# Patient Record
Sex: FEMALE | Race: WHITE | Employment: FULL TIME | URBAN - METROPOLITAN AREA
[De-identification: names, ages, dates, MRNs, and addresses within clinical notes are randomized per-mention and may not be internally consistent; named-entity substitution may affect disease eponyms.]

---

## 2020-08-06 ENCOUNTER — TELEPHONE (OUTPATIENT)
Dept: FAMILY MEDICINE CLINIC | Age: 50
End: 2020-08-06

## 2020-08-06 DIAGNOSIS — M79.605 PAIN IN BOTH LOWER EXTREMITIES: ICD-10-CM

## 2020-08-06 DIAGNOSIS — F90.0 ATTENTION DEFICIT HYPERACTIVITY DISORDER (ADHD), PREDOMINANTLY INATTENTIVE TYPE: Primary | ICD-10-CM

## 2020-08-06 DIAGNOSIS — M79.604 PAIN IN BOTH LOWER EXTREMITIES: ICD-10-CM

## 2020-08-06 NOTE — TELEPHONE ENCOUNTER
Pt left message requesting refill on Adderall and Hydrocodone. Listed in [facility] as:    dextroamphetamine-amphetamine 20 mg tablet  Take 2 tablets twice a day by oral route. HYDROcodone 10 mg-acetaminophen 325 mg tablet  Take 1 tablet 4 times a day by oral route as needed.

## 2020-08-07 PROBLEM — M79.604 PAIN IN BOTH LOWER EXTREMITIES: Status: ACTIVE | Noted: 2020-08-07

## 2020-08-07 PROBLEM — F90.0 ATTENTION DEFICIT HYPERACTIVITY DISORDER (ADHD), PREDOMINANTLY INATTENTIVE TYPE: Status: ACTIVE | Noted: 2020-08-07

## 2020-08-07 PROBLEM — M79.605 PAIN IN BOTH LOWER EXTREMITIES: Status: ACTIVE | Noted: 2020-08-07

## 2020-08-07 RX ORDER — DEXTROAMPHETAMINE SACCHARATE, AMPHETAMINE ASPARTATE, DEXTROAMPHETAMINE SULFATE AND AMPHETAMINE SULFATE 5; 5; 5; 5 MG/1; MG/1; MG/1; MG/1
TABLET ORAL
Qty: 120 TAB | Refills: 0 | Status: SHIPPED | OUTPATIENT
Start: 2020-08-07 | End: 2020-08-28 | Stop reason: SDUPTHER

## 2020-08-07 RX ORDER — HYDROCODONE BITARTRATE AND ACETAMINOPHEN 10; 325 MG/1; MG/1
1 TABLET ORAL
Qty: 120 TAB | Refills: 0 | Status: SHIPPED | OUTPATIENT
Start: 2020-08-07 | End: 2020-08-28 | Stop reason: SDUPTHER

## 2020-08-07 RX ORDER — DEXTROAMPHETAMINE SACCHARATE, AMPHETAMINE ASPARTATE, DEXTROAMPHETAMINE SULFATE AND AMPHETAMINE SULFATE 5; 5; 5; 5 MG/1; MG/1; MG/1; MG/1
20 TABLET ORAL 2 TIMES DAILY
COMMUNITY
End: 2020-08-07 | Stop reason: SDUPTHER

## 2020-08-07 NOTE — TELEPHONE ENCOUNTER
The patient requires narcotic pain medication for the diagnosis of:Leg pain, back and neck pain, knee pain. Cannot take nsaids as she is on warfarin for chronic DVT. Medication: Hydrocodone 10/325 mg 4 times daily PRN  Adjunctive therapy tried:  Prescription Monitoring Program Queried:          Last Refill:  7/7/2020  MME: 36  Benzo:NONE  UDS:UP to date  Reason to continue:It does allow her to function and rest.   WE have discussed the following regarding this narcotic prescription on multiple occasions:  Risk associated with medicine including but not limited to addiction, overdose, mental impairment , disability and /or death. ESPECIALLY if taken other than as prescribed which is AS NEEDED FOR PAIN. IT is NOT a nerve pill or antidepressant or sleeping pill. Take sparingly   Let the doctor know of any side effects  Appropriate use      1. Attention deficit hyperactivity disorder (ADHD), predominantly inattentive type  Stable with adderall,  Allows her to function at work. - dextroamphetamine-amphetamine (ADDERALL) 20 mg tablet; Takes 2 tablets orally BID (2 in am and 2 in afternoon)  Dispense: 120 Tab; Refill: 0    2. Pain in both lower extremities    - HYDROcodone-acetaminophen (NORCO)  mg tablet; Take 1 Tab by mouth every six (6) hours as needed for Pain for up to 30 days. Max Daily Amount: 4 Tabs. Dispense: 120 Tab;  Refill: 0

## 2020-08-10 ENCOUNTER — CLINICAL SUPPORT (OUTPATIENT)
Dept: FAMILY MEDICINE CLINIC | Age: 50
End: 2020-08-10
Payer: COMMERCIAL

## 2020-08-10 VITALS
HEIGHT: 67 IN | DIASTOLIC BLOOD PRESSURE: 76 MMHG | HEART RATE: 110 BPM | RESPIRATION RATE: 18 BRPM | WEIGHT: 210 LBS | BODY MASS INDEX: 32.96 KG/M2 | OXYGEN SATURATION: 98 % | SYSTOLIC BLOOD PRESSURE: 128 MMHG | TEMPERATURE: 98.2 F

## 2020-08-10 DIAGNOSIS — I82.503 CHRONIC DEEP VEIN THROMBOSIS (DVT) OF BOTH LOWER EXTREMITIES, UNSPECIFIED VEIN (HCC): Primary | ICD-10-CM

## 2020-08-10 LAB
INR BLD: 3.2
PT POC: 37.9 SECONDS
VALID INTERNAL CONTROL?: YES

## 2020-08-10 PROCEDURE — 85610 PROTHROMBIN TIME: CPT | Performed by: FAMILY MEDICINE

## 2020-08-10 RX ORDER — ZOLPIDEM TARTRATE 10 MG/1
TABLET ORAL
COMMUNITY
End: 2020-09-11 | Stop reason: SDUPTHER

## 2020-08-10 RX ORDER — LANCETS 33 GAUGE
EACH MISCELLANEOUS
COMMUNITY
End: 2022-06-10 | Stop reason: SDUPTHER

## 2020-08-10 RX ORDER — METFORMIN HYDROCHLORIDE 1000 MG/1
1000 TABLET ORAL 2 TIMES DAILY WITH MEALS
COMMUNITY
End: 2020-11-02

## 2020-08-28 DIAGNOSIS — F90.0 ATTENTION DEFICIT HYPERACTIVITY DISORDER (ADHD), PREDOMINANTLY INATTENTIVE TYPE: ICD-10-CM

## 2020-08-28 DIAGNOSIS — M79.604 PAIN IN BOTH LOWER EXTREMITIES: ICD-10-CM

## 2020-08-28 DIAGNOSIS — M79.605 PAIN IN BOTH LOWER EXTREMITIES: ICD-10-CM

## 2020-08-28 RX ORDER — HYDROCODONE BITARTRATE AND ACETAMINOPHEN 10; 325 MG/1; MG/1
1 TABLET ORAL
Qty: 120 TAB | Refills: 0 | Status: SHIPPED | OUTPATIENT
Start: 2020-08-28 | End: 2020-09-27

## 2020-08-28 RX ORDER — DEXTROAMPHETAMINE SACCHARATE, AMPHETAMINE ASPARTATE, DEXTROAMPHETAMINE SULFATE AND AMPHETAMINE SULFATE 5; 5; 5; 5 MG/1; MG/1; MG/1; MG/1
TABLET ORAL
Qty: 120 TAB | Refills: 0 | Status: SHIPPED | OUTPATIENT
Start: 2020-08-28 | End: 2020-10-07 | Stop reason: SDUPTHER

## 2020-08-28 NOTE — PROGRESS NOTES
1. Attention deficit hyperactivity disorder (ADHD), predominantly inattentive type    - dextroamphetamine-amphetamine (ADDERALL) 20 mg tablet; Takes 2 tablets orally BID (2 in am and 2 in afternoon)  Dispense: 120 Tab; Refill: 0    2. Pain in both lower extremities    - HYDROcodone-acetaminophen (NORCO)  mg tablet; Take 1 Tab by mouth every six (6) hours as needed for Pain for up to 30 days. Max Daily Amount: 4 Tabs. Dispense: 120 Tab;  Refill: 0

## 2020-09-08 RX ORDER — WARFARIN 10 MG/1
TABLET ORAL
Qty: 30 TAB | Refills: 5 | Status: SHIPPED | OUTPATIENT
Start: 2020-09-08 | End: 2021-09-03

## 2020-09-11 ENCOUNTER — CLINICAL SUPPORT (OUTPATIENT)
Dept: FAMILY MEDICINE CLINIC | Age: 50
End: 2020-09-11
Payer: COMMERCIAL

## 2020-09-11 ENCOUNTER — TELEPHONE (OUTPATIENT)
Dept: FAMILY MEDICINE CLINIC | Age: 50
End: 2020-09-11

## 2020-09-11 VITALS
WEIGHT: 213 LBS | HEART RATE: 88 BPM | OXYGEN SATURATION: 98 % | RESPIRATION RATE: 18 BRPM | TEMPERATURE: 98.2 F | SYSTOLIC BLOOD PRESSURE: 122 MMHG | DIASTOLIC BLOOD PRESSURE: 80 MMHG | BODY MASS INDEX: 33.86 KG/M2

## 2020-09-11 DIAGNOSIS — F51.01 PRIMARY INSOMNIA: ICD-10-CM

## 2020-09-11 DIAGNOSIS — I26.99 PULMONARY EMBOLISM, UNSPECIFIED CHRONICITY, UNSPECIFIED PULMONARY EMBOLISM TYPE, UNSPECIFIED WHETHER ACUTE COR PULMONALE PRESENT (HCC): Primary | ICD-10-CM

## 2020-09-11 DIAGNOSIS — Z86.711 HISTORY OF PULMONARY EMBOLISM: ICD-10-CM

## 2020-09-11 DIAGNOSIS — F51.01 PRIMARY INSOMNIA: Primary | ICD-10-CM

## 2020-09-11 LAB
INR BLD: 2.9
PT POC: 34.3 SECONDS
VALID INTERNAL CONTROL?: YES

## 2020-09-11 PROCEDURE — 93793 ANTICOAG MGMT PT WARFARIN: CPT | Performed by: FAMILY MEDICINE

## 2020-09-11 PROCEDURE — 85610 PROTHROMBIN TIME: CPT | Performed by: FAMILY MEDICINE

## 2020-09-11 RX ORDER — ZOLPIDEM TARTRATE 10 MG/1
10 TABLET ORAL
Qty: 30 TAB | Refills: 3 | Status: SHIPPED | OUTPATIENT
Start: 2020-09-11 | End: 2021-03-25 | Stop reason: SDUPTHER

## 2020-09-11 NOTE — PROGRESS NOTES
Patient presents to office today for PT / INR. She is presently taking warfarin 7.5 mg on Sunday,Monday, Tuesday, Thursday, Friday, Saturday and warfarin 10  Mg on Wednesday. PT- 34.3 and INR 2.9. Patient is also requesting a refill on her zolpidem 10 mg.

## 2020-09-11 NOTE — TELEPHONE ENCOUNTER
1. Primary insomnia    - zolpidem (AMBIEN) 10 mg tablet; Take 1 Tab by mouth nightly as needed for Sleep. Max Daily Amount: 10 mg. Dispense: 30 Tab; Refill: 3      Has rx for hydrocodone and does have narcan on file. Been on this regimen for years and actually only takes the zolpidem during night shift so she can sleep during day.     Tanisha Vicente, DO

## 2020-09-14 RX ORDER — ZOLPIDEM TARTRATE 10 MG/1
10 TABLET ORAL
Qty: 30 TAB | Refills: 3 | Status: SHIPPED | OUTPATIENT
Start: 2020-09-14 | End: 2020-10-07 | Stop reason: SDUPTHER

## 2020-10-07 ENCOUNTER — TELEPHONE (OUTPATIENT)
Dept: FAMILY MEDICINE CLINIC | Age: 50
End: 2020-10-07

## 2020-10-07 DIAGNOSIS — M79.604 PAIN IN BOTH LOWER EXTREMITIES: Primary | ICD-10-CM

## 2020-10-07 DIAGNOSIS — F90.0 ATTENTION DEFICIT HYPERACTIVITY DISORDER (ADHD), PREDOMINANTLY INATTENTIVE TYPE: ICD-10-CM

## 2020-10-07 DIAGNOSIS — M79.605 PAIN IN BOTH LOWER EXTREMITIES: Primary | ICD-10-CM

## 2020-10-07 RX ORDER — HYDROCODONE BITARTRATE AND ACETAMINOPHEN 10; 325 MG/1; MG/1
1 TABLET ORAL
Qty: 120 TAB | Refills: 0 | Status: SHIPPED | OUTPATIENT
Start: 2020-10-07 | End: 2020-10-10

## 2020-10-07 RX ORDER — DEXTROAMPHETAMINE SACCHARATE, AMPHETAMINE ASPARTATE, DEXTROAMPHETAMINE SULFATE AND AMPHETAMINE SULFATE 5; 5; 5; 5 MG/1; MG/1; MG/1; MG/1
TABLET ORAL
Qty: 120 TAB | Refills: 0 | Status: SHIPPED | OUTPATIENT
Start: 2020-10-07 | End: 2020-11-09 | Stop reason: SDUPTHER

## 2020-10-07 NOTE — TELEPHONE ENCOUNTER
48 y.o. , Linsey Mckinney , female       No Known Allergies  Current Outpatient Medications on File Prior to Visit   Medication Sig Dispense Refill    [DISCONTINUED] zolpidem (AMBIEN) 10 mg tablet Take 1 Tab by mouth nightly as needed for Sleep. Max Daily Amount: 10 mg. 30 Tab 3    zolpidem (AMBIEN) 10 mg tablet Take 1 Tab by mouth nightly as needed for Sleep. Max Daily Amount: 10 mg. 30 Tab 3    warfarin (COUMADIN) 10 mg tablet TAKE 1 TABLET BY MOUTH EVERY DAY 30 Tab 5    [DISCONTINUED] dextroamphetamine-amphetamine (ADDERALL) 20 mg tablet Takes 2 tablets orally BID (2 in am and 2 in afternoon) 120 Tab 0    metFORMIN (GLUCOPHAGE) 1,000 mg tablet Take 1,000 mg by mouth two (2) times daily (with meals).  lancets (OneTouch Delica Plus Lancet) 33 gauge misc by Does Not Apply route. No current facility-administered medications on file prior to visit. Patient Active Problem List   Diagnosis Code    Pain in both lower extremities M79.604, M79.605    Attention deficit hyperactivity disorder (ADHD), predominantly inattentive type F90.0       1. Attention deficit hyperactivity disorder (ADHD), predominantly inattentive type    - dextroamphetamine-amphetamine (ADDERALL) 20 mg tablet; Takes 2 tablets orally BID (2 in am and 2 in afternoon)  Dispense: 120 Tab; Refill: 0    2. Pain in both lower extremities    - HYDROcodone-acetaminophen (NORCO)  mg tablet; Take 1 Tab by mouth every six (6) hours as needed for Pain for up to 3 days. Max Daily Amount: 4 Tabs. Dispense: 120 Tab;  Refill: 0      Katerina Lester DO

## 2020-11-02 RX ORDER — METFORMIN HYDROCHLORIDE 1000 MG/1
TABLET ORAL
Qty: 180 TAB | Refills: 3 | Status: SHIPPED | OUTPATIENT
Start: 2020-11-02 | End: 2022-01-10

## 2020-11-09 ENCOUNTER — TELEPHONE (OUTPATIENT)
Dept: FAMILY MEDICINE CLINIC | Age: 50
End: 2020-11-09

## 2020-11-09 DIAGNOSIS — F90.0 ATTENTION DEFICIT HYPERACTIVITY DISORDER (ADHD), PREDOMINANTLY INATTENTIVE TYPE: ICD-10-CM

## 2020-11-09 DIAGNOSIS — M79.605 PAIN IN BOTH LOWER EXTREMITIES: Primary | ICD-10-CM

## 2020-11-09 DIAGNOSIS — M79.604 PAIN IN BOTH LOWER EXTREMITIES: Primary | ICD-10-CM

## 2020-11-09 RX ORDER — DEXTROAMPHETAMINE SACCHARATE, AMPHETAMINE ASPARTATE, DEXTROAMPHETAMINE SULFATE AND AMPHETAMINE SULFATE 5; 5; 5; 5 MG/1; MG/1; MG/1; MG/1
TABLET ORAL
Qty: 120 TAB | Refills: 0 | Status: SHIPPED | OUTPATIENT
Start: 2020-11-09 | End: 2020-12-10 | Stop reason: SDUPTHER

## 2020-11-09 RX ORDER — HYDROCODONE BITARTRATE AND ACETAMINOPHEN 10; 325 MG/1; MG/1
1 TABLET ORAL
Qty: 120 TAB | Refills: 0 | Status: SHIPPED | OUTPATIENT
Start: 2020-11-09 | End: 2020-12-10 | Stop reason: SDUPTHER

## 2020-11-09 NOTE — TELEPHONE ENCOUNTER
48 y.o. , Lauretha Baumgarten , female       No Known Allergies  Current Outpatient Medications on File Prior to Visit   Medication Sig Dispense Refill    metFORMIN (GLUCOPHAGE) 1,000 mg tablet TAKE 1 TABLET BY MOUTH TWICE DAILY BEFORE MEALS 180 Tab 3    [DISCONTINUED] dextroamphetamine-amphetamine (ADDERALL) 20 mg tablet Takes 2 tablets orally BID (2 in am and 2 in afternoon) 120 Tab 0    zolpidem (AMBIEN) 10 mg tablet Take 1 Tab by mouth nightly as needed for Sleep. Max Daily Amount: 10 mg. 30 Tab 3    warfarin (COUMADIN) 10 mg tablet TAKE 1 TABLET BY MOUTH EVERY DAY 30 Tab 5    lancets (OneTouch Delica Plus Lancet) 33 gauge misc by Does Not Apply route. No current facility-administered medications on file prior to visit. Patient Active Problem List   Diagnosis Code    Pain in both lower extremities M79.604, M79.605    Attention deficit hyperactivity disorder (ADHD), predominantly inattentive type F90.0       1. Attention deficit hyperactivity disorder (ADHD), predominantly inattentive type    - dextroamphetamine-amphetamine (ADDERALL) 20 mg tablet; Takes 2 tablets orally BID (2 in am and 2 in afternoon)  Dispense: 120 Tab; Refill: 0    2. Pain in both lower extremities    - HYDROcodone-acetaminophen (NORCO)  mg tablet; Take 1 Tab by mouth every six (6) hours as needed for Pain for up to 30 days. Max Daily Amount: 4 Tabs. Dispense: 120 Tab; Refill: 0      Patient calls requesting a prescription refill for the hydrocodone and Adderall. Because she is not a candidate for nonsteroidals. She has persistent recurrent and chronic back and leg pain off of neck and shoulder pain. 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 The medicine does allow her to have an improved quality life and function. Monitoring program is reviewed and morphine milligram equivalent is 40. There is no aberrant behavior noted. She also takes the Adderall.   This allows her to function during her job which requires a lot of concentration as a . She has not had any instances indication for diversion or misuse.   Josefa Abreu, DO

## 2020-11-09 NOTE — TELEPHONE ENCOUNTER
Patient needs a refill on HYDROcodone 10 mg-acetaminophen 325 mg tablet and dextroamphetamine-amphetamine (ADDERALL) 20 mg tablet sent to [facility]. Patient states that she will run out today.

## 2020-12-07 DIAGNOSIS — I26.99 PULMONARY EMBOLISM, UNSPECIFIED CHRONICITY, UNSPECIFIED PULMONARY EMBOLISM TYPE, UNSPECIFIED WHETHER ACUTE COR PULMONALE PRESENT (HCC): Primary | ICD-10-CM

## 2020-12-07 RX ORDER — WARFARIN 7.5 MG/1
7.5 TABLET ORAL DAILY
Qty: 90 TAB | Refills: 1 | Status: SHIPPED | OUTPATIENT
Start: 2020-12-07 | End: 2021-06-05

## 2020-12-10 ENCOUNTER — TELEPHONE (OUTPATIENT)
Dept: FAMILY MEDICINE CLINIC | Age: 50
End: 2020-12-10

## 2020-12-10 DIAGNOSIS — M79.604 PAIN IN BOTH LOWER EXTREMITIES: ICD-10-CM

## 2020-12-10 DIAGNOSIS — M79.605 PAIN IN BOTH LOWER EXTREMITIES: ICD-10-CM

## 2020-12-10 DIAGNOSIS — F90.0 ATTENTION DEFICIT HYPERACTIVITY DISORDER (ADHD), PREDOMINANTLY INATTENTIVE TYPE: ICD-10-CM

## 2020-12-10 RX ORDER — HYDROCODONE BITARTRATE AND ACETAMINOPHEN 10; 325 MG/1; MG/1
1 TABLET ORAL
Qty: 120 TAB | Refills: 0 | Status: SHIPPED | OUTPATIENT
Start: 2020-12-10 | End: 2020-12-30 | Stop reason: SDUPTHER

## 2020-12-10 RX ORDER — DEXTROAMPHETAMINE SACCHARATE, AMPHETAMINE ASPARTATE, DEXTROAMPHETAMINE SULFATE AND AMPHETAMINE SULFATE 5; 5; 5; 5 MG/1; MG/1; MG/1; MG/1
TABLET ORAL
Qty: 120 TAB | Refills: 0 | Status: SHIPPED | OUTPATIENT
Start: 2020-12-10 | End: 2020-12-30 | Stop reason: SDUPTHER

## 2020-12-10 NOTE — TELEPHONE ENCOUNTER
Pt left message. Checking on the status of refills she requested. Adderall, Hydrocodone, Warfarin 7.5 and Warfarin 10. I see that only the Warfarin 7.5 mg was sent in.

## 2020-12-10 NOTE — TELEPHONE ENCOUNTER
48 y.o. , Dony Shield , female       No Known Allergies  Current Outpatient Medications on File Prior to Visit   Medication Sig Dispense Refill    warfarin (COUMADIN) 7.5 mg tablet Take 1 Tab by mouth daily for 180 days. 90 Tab 1    [DISCONTINUED] dextroamphetamine-amphetamine (ADDERALL) 20 mg tablet Takes 2 tablets orally BID (2 in am and 2 in afternoon) 120 Tab 0    [DISCONTINUED] HYDROcodone-acetaminophen (NORCO)  mg tablet Take 1 Tab by mouth every six (6) hours as needed for Pain for up to 30 days. Max Daily Amount: 4 Tabs. 120 Tab 0    metFORMIN (GLUCOPHAGE) 1,000 mg tablet TAKE 1 TABLET BY MOUTH TWICE DAILY BEFORE MEALS 180 Tab 3    zolpidem (AMBIEN) 10 mg tablet Take 1 Tab by mouth nightly as needed for Sleep. Max Daily Amount: 10 mg. 30 Tab 3    warfarin (COUMADIN) 10 mg tablet TAKE 1 TABLET BY MOUTH EVERY DAY 30 Tab 5    lancets (OneTouch Delica Plus Lancet) 33 gauge misc by Does Not Apply route. No current facility-administered medications on file prior to visit. Patient Active Problem List   Diagnosis Code    Pain in both lower extremities M79.604, M79.605    Attention deficit hyperactivity disorder (ADHD), predominantly inattentive type F90.0         ICD-10-CM ICD-9-CM    1. Attention deficit hyperactivity disorder (ADHD), predominantly inattentive type  F90.0 314.00 dextroamphetamine-amphetamine (ADDERALL) 20 mg tablet   2. Pain in both lower extremities  M79.604 729.5 HYDROcodone-acetaminophen (NORCO)  mg tablet    M79.605         Reason for pain medicine:  Back , knee , shoulder pain and cannot take NSAIDS due to anticoagulation  Name of pain medication: Hydrocodone/apap 10/325   was reviewed today and we find the following results;   Last refill of narcotic: 11-9-2020  Last refill of Benzo(if applicable):  NONE  MME:  36  NARCAN:  On file   Review of records :NO indicators of diversion or misuse  ADDITIONAL INDICATORS: NONE on   Reasoning (including goals of therapy): This medicine does allow improved quality life and function for [facility] Her last visit was March 2020. Her urine drug screen then was appropriate. I do recommend she comes in within the next month or 2 to get more urine drug screening and update her controlled substance contract with [facility]. She will be advised per staff.       Cash Heredia, DO

## 2020-12-10 NOTE — TELEPHONE ENCOUNTER
Patient called requesting CV-BAYFKASP and RX-Hydrocodone be sent to the pharmacy. Patient is out as of yesterday.

## 2020-12-30 DIAGNOSIS — M79.605 PAIN IN BOTH LOWER EXTREMITIES: ICD-10-CM

## 2020-12-30 DIAGNOSIS — M79.604 PAIN IN BOTH LOWER EXTREMITIES: ICD-10-CM

## 2020-12-30 DIAGNOSIS — F90.0 ATTENTION DEFICIT HYPERACTIVITY DISORDER (ADHD), PREDOMINANTLY INATTENTIVE TYPE: ICD-10-CM

## 2020-12-30 RX ORDER — DEXTROAMPHETAMINE SACCHARATE, AMPHETAMINE ASPARTATE, DEXTROAMPHETAMINE SULFATE AND AMPHETAMINE SULFATE 5; 5; 5; 5 MG/1; MG/1; MG/1; MG/1
TABLET ORAL
Qty: 120 TAB | Refills: 0 | Status: SHIPPED | OUTPATIENT
Start: 2020-12-30 | End: 2021-01-13 | Stop reason: SDUPTHER

## 2020-12-30 RX ORDER — HYDROCODONE BITARTRATE AND ACETAMINOPHEN 10; 325 MG/1; MG/1
1 TABLET ORAL
Qty: 120 TAB | Refills: 0 | Status: SHIPPED | OUTPATIENT
Start: 2020-12-30 | End: 2021-01-13 | Stop reason: SDUPTHER

## 2020-12-30 NOTE — PROGRESS NOTES
48 y.o. , Narvis Fillers , female       No Known Allergies  Current Outpatient Medications on File Prior to Visit   Medication Sig Dispense Refill    [DISCONTINUED] dextroamphetamine-amphetamine (ADDERALL) 20 mg tablet Takes 2 tablets orally BID (2 in am and 2 in afternoon) 120 Tab 0    [DISCONTINUED] HYDROcodone-acetaminophen (NORCO)  mg tablet Take 1 Tab by mouth every six (6) hours as needed for Pain for up to 30 days. Max Daily Amount: 4 Tabs. 120 Tab 0    warfarin (COUMADIN) 7.5 mg tablet Take 1 Tab by mouth daily for 180 days. 90 Tab 1    metFORMIN (GLUCOPHAGE) 1,000 mg tablet TAKE 1 TABLET BY MOUTH TWICE DAILY BEFORE MEALS 180 Tab 3    zolpidem (AMBIEN) 10 mg tablet Take 1 Tab by mouth nightly as needed for Sleep. Max Daily Amount: 10 mg. 30 Tab 3    warfarin (COUMADIN) 10 mg tablet TAKE 1 TABLET BY MOUTH EVERY DAY 30 Tab 5    lancets (OneTouch Delica Plus Lancet) 33 gauge misc by Does Not Apply route. No current facility-administered medications on file prior to visit. Patient Active Problem List   Diagnosis Code    Pain in both lower extremities M79.604, M79.605    Attention deficit hyperactivity disorder (ADHD), predominantly inattentive type F90.0         ICD-10-CM ICD-9-CM    1. Pain in both lower extremities  M79.604 729.5 HYDROcodone-acetaminophen (NORCO)  mg tablet    M79.605     2. Attention deficit hyperactivity disorder (ADHD), predominantly inattentive type  F90.0 314.00 dextroamphetamine-amphetamine (ADDERALL) 20 mg tablet       Patient is up to date on office visits, needs one next month however, up to date on UDS and  reviewed within last 3 months. Medication sent in as She is stable and I will be out when it is due.      Maycol Seats, DO

## 2021-01-13 ENCOUNTER — OFFICE VISIT (OUTPATIENT)
Dept: FAMILY MEDICINE CLINIC | Age: 51
End: 2021-01-13
Payer: COMMERCIAL

## 2021-01-13 VITALS
DIASTOLIC BLOOD PRESSURE: 89 MMHG | WEIGHT: 213 LBS | OXYGEN SATURATION: 96 % | HEART RATE: 117 BPM | HEIGHT: 67 IN | BODY MASS INDEX: 33.43 KG/M2 | TEMPERATURE: 97.3 F | SYSTOLIC BLOOD PRESSURE: 158 MMHG

## 2021-01-13 DIAGNOSIS — G89.29 CHRONIC PAIN OF BOTH KNEES: ICD-10-CM

## 2021-01-13 DIAGNOSIS — M54.42 CHRONIC BILATERAL LOW BACK PAIN WITH BILATERAL SCIATICA: ICD-10-CM

## 2021-01-13 DIAGNOSIS — M79.605 PAIN IN BOTH LOWER EXTREMITIES: Primary | ICD-10-CM

## 2021-01-13 DIAGNOSIS — I26.99 PULMONARY EMBOLISM, UNSPECIFIED CHRONICITY, UNSPECIFIED PULMONARY EMBOLISM TYPE, UNSPECIFIED WHETHER ACUTE COR PULMONALE PRESENT (HCC): ICD-10-CM

## 2021-01-13 DIAGNOSIS — F90.0 ATTENTION DEFICIT HYPERACTIVITY DISORDER (ADHD), PREDOMINANTLY INATTENTIVE TYPE: ICD-10-CM

## 2021-01-13 DIAGNOSIS — G89.29 CHRONIC BILATERAL LOW BACK PAIN WITH BILATERAL SCIATICA: ICD-10-CM

## 2021-01-13 DIAGNOSIS — M25.561 CHRONIC PAIN OF BOTH KNEES: ICD-10-CM

## 2021-01-13 DIAGNOSIS — M54.41 CHRONIC BILATERAL LOW BACK PAIN WITH BILATERAL SCIATICA: ICD-10-CM

## 2021-01-13 DIAGNOSIS — M79.604 PAIN IN BOTH LOWER EXTREMITIES: Primary | ICD-10-CM

## 2021-01-13 DIAGNOSIS — I82.503 CHRONIC DEEP VEIN THROMBOSIS (DVT) OF BOTH LOWER EXTREMITIES, UNSPECIFIED VEIN (HCC): ICD-10-CM

## 2021-01-13 DIAGNOSIS — F51.01 PRIMARY INSOMNIA: ICD-10-CM

## 2021-01-13 DIAGNOSIS — M25.562 CHRONIC PAIN OF BOTH KNEES: ICD-10-CM

## 2021-01-13 DIAGNOSIS — E11.9 CONTROLLED TYPE 2 DIABETES MELLITUS WITHOUT COMPLICATION, WITHOUT LONG-TERM CURRENT USE OF INSULIN (HCC): ICD-10-CM

## 2021-01-13 PROCEDURE — 99214 OFFICE O/P EST MOD 30 MIN: CPT | Performed by: FAMILY MEDICINE

## 2021-01-13 RX ORDER — HYDROCODONE BITARTRATE AND ACETAMINOPHEN 10; 325 MG/1; MG/1
1 TABLET ORAL
Qty: 28 TAB | Refills: 0 | Status: SHIPPED | OUTPATIENT
Start: 2021-01-13 | End: 2021-01-20

## 2021-01-13 RX ORDER — DEXTROAMPHETAMINE SACCHARATE, AMPHETAMINE ASPARTATE, DEXTROAMPHETAMINE SULFATE AND AMPHETAMINE SULFATE 5; 5; 5; 5 MG/1; MG/1; MG/1; MG/1
TABLET ORAL
Qty: 120 TAB | Refills: 0 | Status: SHIPPED | OUTPATIENT
Start: 2021-01-13 | End: 2021-02-15 | Stop reason: SDUPTHER

## 2021-01-13 NOTE — PROGRESS NOTES
IIDENTIFYING INFORMATION:  Saúl Staff. Yen Johnson , 48 y.o., female      CHIEF COMPLAINT:   Chief Complaint   Patient presents with    Diabetes    Pain (Chronic)    Behavioral Problem         HISTORY OF PRESENT ILLNESS:    Adalid Mcallister is a 48 y.o. female with the below listed medical problems whom comes in today for follow-up on her pain in her legs, ADHD, diabetes. Dayan Steele cannot take nonsteroidals because she is on warfarin chronically for chronic DVTs and PE. She is seen the vascular specialist before recommend she go off meds but this was not consistent with what was in the literature. Come to find out his note indicated that she had only had 1 episode of this. This is many years ago and she does not recollect being tested for hypercoagulable state. Nonetheless, she does well on the warfarin. Her INR generally between 1.8 and 3.6. She does work shift work and will occasionally forget days. She has no bruising or bleeding issues no leg swelling. She does have chronic knee and back pain. That gets worse with more activity she does. 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 She is not able to take the nonsteroidals. She will take 2-3 hydrocodone per day and sometimes 4 on a very bad day. Unfortunate, her insurance changed and she has had switched pharmacies and may be having some issues try to get medicine. She is always been diligent and she says her last dose of hydrocodone was day prior. That is due to this insurance issue. I did give her a week of medicine until we get this prior authorization resolved. She says it does allow her to function especially she works hard on the farm and she has no dizziness or drowsiness. She has no constipation. She also has ADHD and continues to meet criteria. 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   She will not take it when she is on the night shift because she does have to go to sleep since she gets home and often cannot. She has no chest pain or palpitations. She has gained a little bit of weight but generally stays around the same and her appetite is good. She also has diabetes and is due for some blood work. She feels like her current regimen of medications are helping. Lastly, she will occasionally take zolpidem to help her sleep when she is on the night shift. PAST MEDICAL HISTORY:   Patient Active Problem List   Diagnosis Code    Pain in both lower extremities M79.604, M79.605    Attention deficit hyperactivity disorder (ADHD), predominantly inattentive type F90.0        ALLERGIES:   No Known Allergies      MEDICATIONS:     Current Outpatient Medications:     HYDROcodone-acetaminophen (NORCO)  mg tablet, Take 1 Tab by mouth every six (6) hours as needed for Pain for up to 30 days. Max Daily Amount: 4 Tabs. May fill on or after January 7, 2021, Disp: 120 Tab, Rfl: 0    dextroamphetamine-amphetamine (ADDERALL) 20 mg tablet, Takes 2 tablets orally BID (2 in am and 2 in afternoon)-May fill on or after January 7, 2021, Disp: 120 Tab, Rfl: 0    warfarin (COUMADIN) 7.5 mg tablet, Take 1 Tab by mouth daily for 180 days. , Disp: 90 Tab, Rfl: 1    metFORMIN (GLUCOPHAGE) 1,000 mg tablet, TAKE 1 TABLET BY MOUTH TWICE DAILY BEFORE MEALS, Disp: 180 Tab, Rfl: 3    zolpidem (AMBIEN) 10 mg tablet, Take 1 Tab by mouth nightly as needed for Sleep. Max Daily Amount: 10 mg., Disp: 30 Tab, Rfl: 3    warfarin (COUMADIN) 10 mg tablet, TAKE 1 TABLET BY MOUTH EVERY DAY, Disp: 30 Tab, Rfl: 5    lancets (OneTouch Delica Plus Lancet) 33 gauge misc, by Does Not Apply route., Disp: , Rfl:     SOCIAL HISTORY:   Social History     Tobacco Use    Smoking status: Former Smoker    Smokeless tobacco: Never Used   Substance Use Topics    Alcohol use: Not on file    Drug use: Not on file       SURGICAL HISTORY:  History reviewed.  No pertinent surgical history. FAMILY HISTORY:  Family History   Problem Relation Age of Onset    No Known Problems Mother     No Known Problems Father          REVIEW OF SYSTEMS:  I personally collected this information from all available source present (patient/others in room and records available) -JLEWISDESTHER    Review of Systems   Constitutional: Negative for activity change, appetite change, chills, diaphoresis, fever and unexpected weight change. HENT: Negative for congestion, ear discharge, ear pain, hearing loss, rhinorrhea, sinus pressure, sneezing, sore throat, tinnitus, trouble swallowing and voice change. Eyes: Negative for photophobia, pain, discharge and visual disturbance. Respiratory: Negative for cough, chest tightness, shortness of breath and wheezing. Cardiovascular: Negative for chest pain, palpitations and leg swelling. Gastrointestinal: Negative for abdominal distention, abdominal pain, blood in stool, constipation, diarrhea, nausea and vomiting. Endocrine: Negative for cold intolerance, heat intolerance, polydipsia and polyphagia. Genitourinary: Negative for difficulty urinating, dysuria, frequency, hematuria and urgency. Musculoskeletal: Positive for arthralgias, back pain, gait problem, joint swelling and myalgias. Negative for neck pain. Skin: Negative for color change and rash. Neurological: Negative for dizziness, tremors, syncope, speech difficulty, weakness, light-headedness, numbness and headaches. Hematological: Negative for adenopathy. Does not bruise/bleed easily. Psychiatric/Behavioral: Negative for agitation, behavioral problems, confusion, decreased concentration, dysphoric mood, hallucinations, sleep disturbance and suicidal ideas. The patient is not nervous/anxious.                  PHYSICAL EXAMINATION:  Vital Signs:    Visit Vitals  BP (!) 158/89 (BP 1 Location: Right arm, BP Patient Position: Sitting)   Pulse (!) 117   Temp 97.3 °F (36.3 °C) (Temporal)   Ht 5' 6.5\" (1.689 m)   Wt 213 lb (96.6 kg)   SpO2 96%   BMI 33.86 kg/m²         Wt Readings from Last 3 Encounters:   01/13/21 213 lb (96.6 kg)   09/11/20 213 lb (96.6 kg)   08/10/20 210 lb (95.3 kg)     BP Readings from Last 3 Encounters:   01/13/21 (!) 158/89   09/11/20 122/80   08/10/20 128/76         Physical Exam  Nursing note reviewed. Constitutional:       General: She is not in acute distress. Appearance: Normal appearance. She is obese. She is not ill-appearing or toxic-appearing. HENT:      Right Ear: Tympanic membrane, ear canal and external ear normal.      Left Ear: Tympanic membrane, ear canal and external ear normal.      Nose: No congestion or rhinorrhea. Mouth/Throat:      Pharynx: No oropharyngeal exudate or posterior oropharyngeal erythema. Eyes:      General: No scleral icterus. Extraocular Movements: Extraocular movements intact. Conjunctiva/sclera: Conjunctivae normal.      Pupils: Pupils are equal, round, and reactive to light. Neck:      Musculoskeletal: No neck rigidity or muscular tenderness. Vascular: No carotid bruit. Cardiovascular:      Rate and Rhythm: Normal rate and regular rhythm. Heart sounds: No murmur. No friction rub. No gallop. Pulmonary:      Effort: Pulmonary effort is normal.      Breath sounds: Normal breath sounds. No wheezing, rhonchi or rales. Abdominal:      General: Bowel sounds are normal. There is no distension. Palpations: Abdomen is soft. There is no mass. Tenderness: There is no abdominal tenderness. Musculoskeletal:         General: Swelling, tenderness and deformity present. Right lower leg: No edema. Left lower leg: No edema. Comments: Right knee is tremendously tender just to light touch on the medial and lateral compartments with some effusion noted medially. Crepitant range of motion. Range is full but very tender and painful at the end of the extension.   She also has multiple tender muscle knots on the right L3-5. She is slow to stand from a seated position and has a very slightly antalgic gait. Lymphadenopathy:      Cervical: No cervical adenopathy. Skin:     Capillary Refill: Capillary refill takes less than 2 seconds. Coloration: Skin is not jaundiced. Findings: No erythema or rash. Neurological:      General: No focal deficit present. Mental Status: She is alert and oriented to person, place, and time. Mental status is at baseline. Cranial Nerves: No cranial nerve deficit. Sensory: No sensory deficit. Coordination: Coordination normal.      Gait: Gait abnormal.   Psychiatric:         Mood and Affect: Mood normal.         Behavior: Behavior normal.         Thought Content: Thought content normal.         Judgment: Judgment normal.         ASSESSMENT/PLAN:          Discussion (regarding today's visit):  Yoan Hemphill and LOKESH did discuss the below listed Items and issues and all questions were answered to her satisfaction. WE also reviewed and discussed each diagnosis listed for today's visit including medications, treatment, testing such as labs, imagine, referrals and when to call regarding results and appointments. Reminded patient to keep any and all appointments with specialists, labs, imaging. Reminded patient to make sure we get copies of any specialists care, labs and imaging. Reminded patient to call of come by the office if there are any concerns, questions , comments or problems. The patient verbalized understanding of the care plan and all questions were answered to the patient's satisfaction prior to leaving the office. The patient was told that failure to comply with recommended testing could result in abnormal health consequences. The patient was instructed to have yearly routine health maintenance including but not limited to age appropriate vaccines, testing, screening exams.     1. Pain in both lower extremities  1. Chronic DVT, osteoarthritis  2. She does very well with hydrocodone. 3.  I sent a week of medicine until we get a prior authorization but I have not seen anything come through from the pharmacy. 4.  Morphine milligram equivalent is 40, uses benzos to sleep periodically, Narcan has been recommended but declined. She has had no aberrant behavior to indicate diversion or misuse. The medication does allow her to function and have a better quality of life. Urine drug screen is obtained today and no controlled substance agreement is done. Per usual risk of medicine explained again.  - TOXASSURE SELECT 13 (MW)  - HYDROcodone-acetaminophen (NORCO)  mg tablet; Take 1 Tab by mouth every six (6) hours as needed for Pain for up to 7 days. Max Daily Amount: 4 Tabs. May fill on or after January 7, 2021  Dispense: 28 Tab; Refill: 0    2. Pulmonary embolism, unspecified chronicity, unspecified pulmonary embolism type, unspecified whether acute cor pulmonale present ([facility] [address]  1. Continue with the warfarin    3. Attention deficit hyperactivity disorder (ADHD), predominantly inattentive type  1. Continues to meet criteria for ADHD  2. Continue the Adderall  3. As always the risk was again explained to the patient. - dextroamphetamine-amphetamine (ADDERALL) 20 mg tablet; Takes 2 tablets orally BID (2 in am and 2 in afternoon)  Dispense: 120 Tab; Refill: 0    4. Primary insomnia  1. Continue to use zolpidem as needed    5. Chronic deep vein thrombosis (DVT) of both lower extremities, unspecified vein (HCC)  1. See #2 as above. - PROTHROMBIN TIME + INR    6. Controlled type 2 diabetes mellitus without complication, without long-term current use of insulin ([facility] [address]  1. Check labs, follow-up and treat as indicated. - HEMOGLOBIN A1C WITH EAG  - LIPID PANEL  - METABOLIC PANEL, COMPREHENSIVE    7. Chronic pain of both knees  1. May continue the hydrocodone.     8. Chronic bilateral low back pain with bilateral sciatica  1. As noted may continue the hydrocodone. The patient actively participated in medical decision making. FOLLOW UP:   Patient knows to keep any and all future visits scheduled unless told otherwise. Patient knows to call, come back if any concerns, questions, comments or problems arise. Follow-up and Dispositions    · Return in about 3 months (around 4/13/2021). This visit may have been completed , in part, with voice recognition software as well as typing and may have syntax errors despite editing.       Mari Kasper, DO

## 2021-01-17 LAB — DRUGS UR: NORMAL

## 2021-02-05 ENCOUNTER — TELEPHONE (OUTPATIENT)
Dept: FAMILY MEDICINE CLINIC | Age: 51
End: 2021-02-05

## 2021-02-05 NOTE — TELEPHONE ENCOUNTER
Pt left message. It was confusing. Wanting to know if we got her straight. Was talking about Adderall being covered for part of it. States that she is 7 1/2 days short. Then started talking about insurance not covering the QID but will only cover TID. Then mentioned #90 of hydrocodone. She called back a few minutes later saying she didn't remember if she told me which med. Which was for Adderall.

## 2021-02-08 NOTE — TELEPHONE ENCOUNTER
Michael from [employer] left a voicemessage stating he had faxed over a PA form. Please return to fax [phone] or call with questions [phone].

## 2021-02-12 ENCOUNTER — TELEPHONE (OUTPATIENT)
Dept: FAMILY MEDICINE CLINIC | Age: 51
End: 2021-02-12

## 2021-02-12 DIAGNOSIS — F90.0 ATTENTION DEFICIT HYPERACTIVITY DISORDER (ADHD), PREDOMINANTLY INATTENTIVE TYPE: ICD-10-CM

## 2021-02-15 ENCOUNTER — TELEPHONE (OUTPATIENT)
Dept: FAMILY MEDICINE CLINIC | Age: 51
End: 2021-02-15

## 2021-02-15 DIAGNOSIS — F90.0 ATTENTION DEFICIT HYPERACTIVITY DISORDER (ADHD), PREDOMINANTLY INATTENTIVE TYPE: Primary | ICD-10-CM

## 2021-02-15 RX ORDER — DEXTROAMPHETAMINE SACCHARATE, AMPHETAMINE ASPARTATE, DEXTROAMPHETAMINE SULFATE AND AMPHETAMINE SULFATE 5; 5; 5; 5 MG/1; MG/1; MG/1; MG/1
TABLET ORAL
Qty: 120 TAB | Refills: 0 | Status: SHIPPED | OUTPATIENT
Start: 2021-02-15 | End: 2021-02-17 | Stop reason: DRUGHIGH

## 2021-02-15 NOTE — TELEPHONE ENCOUNTER
Identifying Data:  50 y.o. , Jocelyn Leonard , female     Historical Data Reviewed ;  Allergies-  No Known Allergies  Current medication as of last visit and reconciliation-  Current Outpatient Medications on File Prior to Visit   Medication Sig Dispense Refill   • [DISCONTINUED] dextroamphetamine-amphetamine (ADDERALL) 20 mg tablet Takes 2 tablets orally BID (2 in am and 2 in afternoon) 120 Tab 0   • warfarin (COUMADIN) 7.5 mg tablet Take 1 Tab by mouth daily for 180 days. 90 Tab 1   • metFORMIN (GLUCOPHAGE) 1,000 mg tablet TAKE 1 TABLET BY MOUTH TWICE DAILY BEFORE MEALS 180 Tab 3   • zolpidem (AMBIEN) 10 mg tablet Take 1 Tab by mouth nightly as needed for Sleep. Max Daily Amount: 10 mg. 30 Tab 3   • warfarin (COUMADIN) 10 mg tablet TAKE 1 TABLET BY MOUTH EVERY DAY 30 Tab 5   • lancets (OneTouch Delica Plus Lancet) 33 gauge misc by Does Not Apply route.       No current facility-administered medications on file prior to visit.      Past Medical History-  Patient Active Problem List   Diagnosis Code   • Pain in both lower extremities M79.604, M79.605   • Attention deficit hyperactivity disorder (ADHD), predominantly inattentive type F90.0       Assessment and Plan:    ICD-10-CM ICD-9-CM    1. Attention deficit hyperactivity disorder (ADHD), predominantly inattentive type  F90.0 314.00 dextroamphetamine-amphetamine (ADDERALL) 20 mg tablet           Lino Lau DO

## 2021-02-15 NOTE — TELEPHONE ENCOUNTER
Received fax from [facility] regarding pts rx for D-Amphetamine Salt Combo 20 mg. Note states that \" Plan requires the generic medication to be dispensed. Please fax pharmacy for generic approval or call plan to initiate a PA. Max 3/day per insurance.

## 2021-02-16 ENCOUNTER — TELEPHONE (OUTPATIENT)
Dept: FAMILY MEDICINE CLINIC | Age: 51
End: 2021-02-16

## 2021-02-16 NOTE — TELEPHONE ENCOUNTER
Patient is calling about a prior authoriztion for her RX-Adderall 20 mg tablet that's need to be sent to the pharmacy

## 2021-02-17 RX ORDER — DEXTROAMPHETAMINE SACCHARATE, AMPHETAMINE ASPARTATE, DEXTROAMPHETAMINE SULFATE AND AMPHETAMINE SULFATE 7.5; 7.5; 7.5; 7.5 MG/1; MG/1; MG/1; MG/1
30 TABLET ORAL 2 TIMES DAILY
Qty: 60 TAB | Refills: 0 | Status: SHIPPED | OUTPATIENT
Start: 2021-02-17 | End: 2021-03-12 | Stop reason: SDUPTHER

## 2021-02-17 NOTE — TELEPHONE ENCOUNTER
Identifying Data:  48 y.o. , Mihaela Rebolledo , female     Historical Data Reviewed ; Allergies-  No Known Allergies  Current medication as of last visit and reconciliation-  Current Outpatient Medications on File Prior to Visit   Medication Sig Dispense Refill    [DISCONTINUED] dextroamphetamine-amphetamine (ADDERALL) 20 mg tablet Takes 2 tablets orally BID (2 in am and 2 in afternoon) 120 Tab 0    warfarin (COUMADIN) 7.5 mg tablet Take 1 Tab by mouth daily for 180 days. 90 Tab 1    metFORMIN (GLUCOPHAGE) 1,000 mg tablet TAKE 1 TABLET BY MOUTH TWICE DAILY BEFORE MEALS 180 Tab 3    zolpidem (AMBIEN) 10 mg tablet Take 1 Tab by mouth nightly as needed for Sleep. Max Daily Amount: 10 mg. 30 Tab 3    warfarin (COUMADIN) 10 mg tablet TAKE 1 TABLET BY MOUTH EVERY DAY 30 Tab 5    lancets (OneTouch Delica Plus Lancet) 33 gauge misc by Does Not Apply route. No current facility-administered medications on file prior to visit. Past Medical History-  Patient Active Problem List   Diagnosis Code    Pain in both lower extremities M79.604, M79.605    Attention deficit hyperactivity disorder (ADHD), predominantly inattentive type F90.0       Assessment and Plan:    ICD-10-CM ICD-9-CM    1.  Attention deficit hyperactivity disorder (ADHD), predominantly inattentive type  F90.0 314.00 dextroamphetamine-amphetamine (ADDERALL) 30 mg tablet           Krissy Garibay DO

## 2021-02-19 ENCOUNTER — TELEPHONE (OUTPATIENT)
Dept: FAMILY MEDICINE CLINIC | Age: 51
End: 2021-02-19

## 2021-02-19 DIAGNOSIS — M79.604 PAIN IN BOTH LOWER EXTREMITIES: Primary | ICD-10-CM

## 2021-02-19 DIAGNOSIS — M79.605 PAIN IN BOTH LOWER EXTREMITIES: Primary | ICD-10-CM

## 2021-02-19 RX ORDER — HYDROCODONE BITARTRATE AND ACETAMINOPHEN 10; 325 MG/1; MG/1
1 TABLET ORAL
Qty: 120 TAB | Refills: 0 | Status: SHIPPED | OUTPATIENT
Start: 2021-02-19 | End: 2021-03-12 | Stop reason: SDUPTHER

## 2021-02-19 NOTE — TELEPHONE ENCOUNTER
Identifying Data:  48 y.o. , Zuleima Moore , female     Historical Data Reviewed ; Allergies-  No Known Allergies  Current medication as of last visit and reconciliation-  Current Outpatient Medications on File Prior to Visit   Medication Sig Dispense Refill    dextroamphetamine-amphetamine (ADDERALL) 30 mg tablet Take 1 Tab by mouth two (2) times a day. Max Daily Amount: 2 Tabs. New dose and frequency 60 Tab 0    warfarin (COUMADIN) 7.5 mg tablet Take 1 Tab by mouth daily for 180 days. 90 Tab 1    metFORMIN (GLUCOPHAGE) 1,000 mg tablet TAKE 1 TABLET BY MOUTH TWICE DAILY BEFORE MEALS 180 Tab 3    zolpidem (AMBIEN) 10 mg tablet Take 1 Tab by mouth nightly as needed for Sleep. Max Daily Amount: 10 mg. 30 Tab 3    warfarin (COUMADIN) 10 mg tablet TAKE 1 TABLET BY MOUTH EVERY DAY 30 Tab 5    lancets (OneTouch Delica Plus Lancet) 33 gauge misc by Does Not Apply route. No current facility-administered medications on file prior to visit. Past Medical History-  Patient Active Problem List   Diagnosis Code    Pain in both lower extremities M79.604, M79.605    Attention deficit hyperactivity disorder (ADHD), predominantly inattentive type F90.0       Assessment and Plan:    ICD-10-CM ICD-9-CM    1.  Pain in both lower extremities  M79.604 729.5 HYDROcodone-acetaminophen (NORCO)  mg tablet    M79.605             Memory Encarnacion, DO   e

## 2021-03-12 ENCOUNTER — TELEPHONE (OUTPATIENT)
Dept: FAMILY MEDICINE CLINIC | Age: 51
End: 2021-03-12

## 2021-03-12 DIAGNOSIS — M79.604 PAIN IN BOTH LOWER EXTREMITIES: ICD-10-CM

## 2021-03-12 DIAGNOSIS — F90.0 ATTENTION DEFICIT HYPERACTIVITY DISORDER (ADHD), PREDOMINANTLY INATTENTIVE TYPE: ICD-10-CM

## 2021-03-12 DIAGNOSIS — M79.605 PAIN IN BOTH LOWER EXTREMITIES: ICD-10-CM

## 2021-03-12 RX ORDER — DEXTROAMPHETAMINE SACCHARATE, AMPHETAMINE ASPARTATE, DEXTROAMPHETAMINE SULFATE AND AMPHETAMINE SULFATE 7.5; 7.5; 7.5; 7.5 MG/1; MG/1; MG/1; MG/1
30 TABLET ORAL 2 TIMES DAILY
Qty: 60 TAB | Refills: 0 | Status: SHIPPED | OUTPATIENT
Start: 2021-03-12 | End: 2021-04-15 | Stop reason: SDUPTHER

## 2021-03-12 RX ORDER — HYDROCODONE BITARTRATE AND ACETAMINOPHEN 10; 325 MG/1; MG/1
1 TABLET ORAL
Qty: 120 TAB | Refills: 0 | Status: SHIPPED | OUTPATIENT
Start: 2021-03-12 | End: 2021-04-11

## 2021-03-12 NOTE — TELEPHONE ENCOUNTER
Identifying Data:  48 y.o. , Nadia Moreno , female     Historical Data Reviewed ; Allergies-  No Known Allergies  Current medication as of last visit and reconciliation-  Current Outpatient Medications on File Prior to Visit   Medication Sig Dispense Refill    [DISCONTINUED] HYDROcodone-acetaminophen (NORCO)  mg tablet Take 1 Tab by mouth every six (6) hours as needed for Pain for up to 30 days. Max Daily Amount: 4 Tabs. 120 Tab 0    [DISCONTINUED] dextroamphetamine-amphetamine (ADDERALL) 30 mg tablet Take 1 Tab by mouth two (2) times a day. Max Daily Amount: 2 Tabs. New dose and frequency 60 Tab 0    warfarin (COUMADIN) 7.5 mg tablet Take 1 Tab by mouth daily for 180 days. 90 Tab 1    metFORMIN (GLUCOPHAGE) 1,000 mg tablet TAKE 1 TABLET BY MOUTH TWICE DAILY BEFORE MEALS 180 Tab 3    zolpidem (AMBIEN) 10 mg tablet Take 1 Tab by mouth nightly as needed for Sleep. Max Daily Amount: 10 mg. 30 Tab 3    warfarin (COUMADIN) 10 mg tablet TAKE 1 TABLET BY MOUTH EVERY DAY 30 Tab 5    lancets (OneTouch Delica Plus Lancet) 33 gauge misc by Does Not Apply route. No current facility-administered medications on file prior to visit. Past Medical History-  Patient Active Problem List   Diagnosis Code    Pain in both lower extremities M79.604, M79.605    Attention deficit hyperactivity disorder (ADHD), predominantly inattentive type F90.0       Assessment and Plan:    ICD-10-CM ICD-9-CM    1. Attention deficit hyperactivity disorder (ADHD), predominantly inattentive type  F90.0 314.00 dextroamphetamine-amphetamine (ADDERALL) 30 mg tablet   2. Pain in both lower extremities  M79.604 729.5 HYDROcodone-acetaminophen (NORCO)  mg tablet    M79.605         Ernestina Paez continues to utilize hydrocodone for painas she is not a candiatefor nsaids . Also, uses adderall for focus. Both medications are being used appropriately by patient without aberrant behavior AND she has no side effects per se.      Nonda Goodell Jose Alejandro Si

## 2021-03-16 ENCOUNTER — CLINICAL SUPPORT (OUTPATIENT)
Dept: FAMILY MEDICINE CLINIC | Age: 51
End: 2021-03-16
Payer: COMMERCIAL

## 2021-03-16 VITALS
WEIGHT: 202 LBS | RESPIRATION RATE: 16 BRPM | DIASTOLIC BLOOD PRESSURE: 81 MMHG | SYSTOLIC BLOOD PRESSURE: 136 MMHG | BODY MASS INDEX: 32.47 KG/M2 | TEMPERATURE: 97.5 F | HEIGHT: 66 IN | OXYGEN SATURATION: 98 % | HEART RATE: 92 BPM

## 2021-03-16 DIAGNOSIS — I26.99 PULMONARY EMBOLISM, UNSPECIFIED CHRONICITY, UNSPECIFIED PULMONARY EMBOLISM TYPE, UNSPECIFIED WHETHER ACUTE COR PULMONALE PRESENT (HCC): Primary | ICD-10-CM

## 2021-03-16 LAB
INR BLD: 4.2
PT POC: 50.8 SECONDS
VALID INTERNAL CONTROL?: YES

## 2021-03-16 PROCEDURE — 93793 ANTICOAG MGMT PT WARFARIN: CPT | Performed by: FAMILY MEDICINE

## 2021-03-16 PROCEDURE — 85610 PROTHROMBIN TIME: CPT | Performed by: FAMILY MEDICINE

## 2021-03-16 RX ORDER — WARFARIN SODIUM 5 MG/1
5 TABLET ORAL DAILY
Qty: 90 TAB | Refills: 1 | Status: CANCELLED | OUTPATIENT
Start: 2021-03-16

## 2021-03-16 NOTE — PROGRESS NOTES
Chief Complaint   Patient presents with    Other     INR 4.2  PT 50.8  Taking Coumadin 7.5 daily     Medication Refill     Warfarin 5  mg to [facility] in [place]      1. Have you been to the ER, urgent care clinic since your last visit? Hospitalized since your last visit? No    2. Have you seen or consulted any other health care providers outside of the [address] [place] [address] since your last visit? Include any pap smears or colon screening.  No     Visit Vitals  /81 (BP 1 Location: Left arm, BP Patient Position: Sitting, BP Cuff Size: Adult long)   Pulse 92   Temp 97.5 °F (36.4 °C) (Temporal)   Resp 16   Ht 5' 5.5\" (1.664 m)   Wt 202 lb (91.6 kg)   SpO2 98%   BMI 33.10 kg/m²

## 2021-03-18 DIAGNOSIS — I26.99 PULMONARY EMBOLISM, UNSPECIFIED CHRONICITY, UNSPECIFIED PULMONARY EMBOLISM TYPE, UNSPECIFIED WHETHER ACUTE COR PULMONALE PRESENT (HCC): ICD-10-CM

## 2021-03-18 RX ORDER — WARFARIN SODIUM 5 MG/1
5 TABLET ORAL DAILY
Qty: 30 TAB | Refills: 5 | Status: SHIPPED | OUTPATIENT
Start: 2021-03-18 | End: 2022-04-19 | Stop reason: SDUPTHER

## 2021-03-25 ENCOUNTER — TELEPHONE (OUTPATIENT)
Dept: FAMILY MEDICINE CLINIC | Age: 51
End: 2021-03-25

## 2021-03-25 DIAGNOSIS — F51.01 PRIMARY INSOMNIA: ICD-10-CM

## 2021-03-25 RX ORDER — ZOLPIDEM TARTRATE 10 MG/1
10 TABLET ORAL
Qty: 30 TAB | Refills: 3 | Status: SHIPPED | OUTPATIENT
Start: 2021-03-25 | End: 2021-06-17 | Stop reason: SDUPTHER

## 2021-03-25 NOTE — TELEPHONE ENCOUNTER
Patient needs a refill on zolpidem (AMBIEN) 10 mg tablet sent to [facility]).

## 2021-03-30 ENCOUNTER — CLINICAL SUPPORT (OUTPATIENT)
Dept: FAMILY MEDICINE CLINIC | Age: 51
End: 2021-03-30

## 2021-03-30 VITALS
WEIGHT: 200 LBS | DIASTOLIC BLOOD PRESSURE: 76 MMHG | HEART RATE: 84 BPM | TEMPERATURE: 97.7 F | SYSTOLIC BLOOD PRESSURE: 123 MMHG | OXYGEN SATURATION: 97 % | BODY MASS INDEX: 32.14 KG/M2 | RESPIRATION RATE: 16 BRPM | HEIGHT: 66 IN

## 2021-03-30 DIAGNOSIS — Z86.711 HISTORY OF PULMONARY EMBOLISM: Primary | ICD-10-CM

## 2021-03-30 NOTE — PROGRESS NOTES
Chief Complaint   Patient presents with    Medication Management     Currnetly on 5mg of Warfaring daily  INR 1.6  PT 19.2     1. Have you been to the ER, urgent care clinic since your last visit? Hospitalized since your last visit? No    2. Have you seen or consulted any other health care providers outside of the [address] [place] [address] since your last visit? Include any pap smears or colon screening.  No     Visit Vitals  /76 (BP 1 Location: Left arm, BP Patient Position: Sitting, BP Cuff Size: Adult)   Pulse 84   Temp 97.7 °F (36.5 °C) (Temporal)   Resp 16   Ht 5' 5.5\" (1.664 m)   Wt 200 lb (90.7 kg)   SpO2 97%   BMI 32.78 kg/m²

## 2021-04-01 ENCOUNTER — TELEPHONE (OUTPATIENT)
Dept: FAMILY MEDICINE CLINIC | Age: 51
End: 2021-04-01

## 2021-04-01 NOTE — TELEPHONE ENCOUNTER
----- Message from Sharmin Silva DO sent at 3/31/2021  5:11 PM EDT -----  Regarding: RE: PT/ INR  Increase time 3 days and resume 5 mg daily  ----- Message -----  From: Jaimie Galicia LPN  Sent: 5/43/3734  10:08 AM EDT  To: Sharmin Silva DO  Subject: PT/ INR                                          INR 1.6  PT 19.2    Patient is taking Warfarin 5 mg daily. Please advise. [phone] is her preferred number.       Thanks,   Mihaela Soares

## 2021-04-15 DIAGNOSIS — M79.605 PAIN IN BOTH LOWER EXTREMITIES: Primary | ICD-10-CM

## 2021-04-15 DIAGNOSIS — M79.604 PAIN IN BOTH LOWER EXTREMITIES: Primary | ICD-10-CM

## 2021-04-15 DIAGNOSIS — F90.0 ATTENTION DEFICIT HYPERACTIVITY DISORDER (ADHD), PREDOMINANTLY INATTENTIVE TYPE: ICD-10-CM

## 2021-04-15 RX ORDER — HYDROCODONE BITARTRATE AND ACETAMINOPHEN 10; 325 MG/1; MG/1
1 TABLET ORAL
Qty: 120 TAB | Refills: 0 | Status: SHIPPED | OUTPATIENT
Start: 2021-04-15 | End: 2021-05-15

## 2021-04-15 RX ORDER — DEXTROAMPHETAMINE SACCHARATE, AMPHETAMINE ASPARTATE, DEXTROAMPHETAMINE SULFATE AND AMPHETAMINE SULFATE 7.5; 7.5; 7.5; 7.5 MG/1; MG/1; MG/1; MG/1
30 TABLET ORAL 2 TIMES DAILY
Qty: 60 TAB | Refills: 0 | Status: SHIPPED | OUTPATIENT
Start: 2021-04-15 | End: 2021-04-26 | Stop reason: DRUGHIGH

## 2021-04-15 NOTE — TELEPHONE ENCOUNTER
Identifying Data:  48 y.o. , Bertha Nova , female     Historical Data Reviewed ; Allergies-  No Known Allergies  Current medication as of last visit and reconciliation-  Current Outpatient Medications on File Prior to Visit   Medication Sig Dispense Refill    zolpidem (AMBIEN) 10 mg tablet Take 1 Tab by mouth nightly as needed for Sleep. Max Daily Amount: 10 mg. 30 Tab 3    warfarin (COUMADIN) 5 mg tablet Take 1 Tab by mouth daily. 30 Tab 5    [DISCONTINUED] dextroamphetamine-amphetamine (ADDERALL) 30 mg tablet Take 1 Tab by mouth two (2) times a day. Max Daily Amount: 2 Tabs. New dose and frequency 60 Tab 0    warfarin (COUMADIN) 7.5 mg tablet Take 1 Tab by mouth daily for 180 days. 90 Tab 1    metFORMIN (GLUCOPHAGE) 1,000 mg tablet TAKE 1 TABLET BY MOUTH TWICE DAILY BEFORE MEALS 180 Tab 3    warfarin (COUMADIN) 10 mg tablet TAKE 1 TABLET BY MOUTH EVERY DAY 30 Tab 5    lancets (OneTouch Delica Plus Lancet) 33 gauge misc by Does Not Apply route. No current facility-administered medications on file prior to visit. Past Medical History-  Patient Active Problem List   Diagnosis Code    Pain in both lower extremities M79.604, M79.605    Attention deficit hyperactivity disorder (ADHD), predominantly inattentive type F90.0       Assessment and Plan:    ICD-10-CM ICD-9-CM    1. Pain in both lower extremities  M79.604 729.5 HYDROcodone-acetaminophen (NORCO)  mg tablet    M79.605     2.  Attention deficit hyperactivity disorder (ADHD), predominantly inattentive type  F90.0 314.00 dextroamphetamine-amphetamine (ADDERALL) 30 mg tablet           Alyssa Sanders,

## 2021-04-26 ENCOUNTER — OFFICE VISIT (OUTPATIENT)
Dept: FAMILY MEDICINE CLINIC | Age: 51
End: 2021-04-26
Payer: COMMERCIAL

## 2021-04-26 VITALS
WEIGHT: 200 LBS | BODY MASS INDEX: 32.78 KG/M2 | HEART RATE: 99 BPM | DIASTOLIC BLOOD PRESSURE: 89 MMHG | OXYGEN SATURATION: 98 % | SYSTOLIC BLOOD PRESSURE: 137 MMHG | TEMPERATURE: 97.3 F

## 2021-04-26 DIAGNOSIS — F51.01 PRIMARY INSOMNIA: ICD-10-CM

## 2021-04-26 DIAGNOSIS — M79.605 PAIN IN BOTH LOWER EXTREMITIES: ICD-10-CM

## 2021-04-26 DIAGNOSIS — M79.604 PAIN IN BOTH LOWER EXTREMITIES: ICD-10-CM

## 2021-04-26 DIAGNOSIS — I82.503 CHRONIC DEEP VEIN THROMBOSIS (DVT) OF BOTH LOWER EXTREMITIES, UNSPECIFIED VEIN (HCC): Primary | ICD-10-CM

## 2021-04-26 DIAGNOSIS — F90.0 ATTENTION DEFICIT HYPERACTIVITY DISORDER (ADHD), PREDOMINANTLY INATTENTIVE TYPE: ICD-10-CM

## 2021-04-26 DIAGNOSIS — E11.9 CONTROLLED TYPE 2 DIABETES MELLITUS WITHOUT COMPLICATION, WITHOUT LONG-TERM CURRENT USE OF INSULIN (HCC): ICD-10-CM

## 2021-04-26 LAB
INR BLD: 1.8
PT POC: 21.1 SECONDS
VALID INTERNAL CONTROL?: YES

## 2021-04-26 PROCEDURE — 99214 OFFICE O/P EST MOD 30 MIN: CPT | Performed by: FAMILY MEDICINE

## 2021-04-26 PROCEDURE — 85610 PROTHROMBIN TIME: CPT | Performed by: FAMILY MEDICINE

## 2021-04-26 RX ORDER — DEXTROAMPHETAMINE SACCHARATE, AMPHETAMINE ASPARTATE, DEXTROAMPHETAMINE SULFATE AND AMPHETAMINE SULFATE 5; 5; 5; 5 MG/1; MG/1; MG/1; MG/1
20 TABLET ORAL 3 TIMES DAILY
COMMUNITY
End: 2021-05-19 | Stop reason: SDUPTHER

## 2021-04-26 NOTE — PATIENT INSTRUCTIONS
     
Routine Health maintenance: You need to get a yearly follow up/physical exam to review, discuss age and gender appropriate exams, labs, vaccines and screening tests. This includes cardiovascular health risk, cancer screens and other aldo related topics. Medications-Take all medications as directed. Please do not stop unless you talk to your doctor or health care provider first. Report any problems immediately. Referrals: if you have been given a referral, please call the office if you do not hear from provider in one week. You may make the appointment yourself. Please keep all appointments with specialists and ask them to send their notes, thoughts, recommendations to us , as your PCP. Imaging/Labs:  Be sure to get these images in a timely manner. IF your test must be scheduled, let us know if you need help getting this done and if you do not hear from that provider in a week , call us or them.   BE SURE to call the office if you do not hear regarding the results in one week after the test is performed Image or lab). It is our intention to inform you of the results ALWAYS, even if normal you should get a notification (Call, portal message). PLEASE jean-paul if you do not get the results. PLEASE follow all recommendations and call/come in /ask questions if you do not understand of if problems develop after or in between visits. Failure to comply with recommended health care advise could result in serious health consequences. Thank you for choosing our practice and please let us know how we can help you feel better and stay well! YOU have been given a prescription for narcotic pain medication. It can cause addiction,l overdose, mental impairment, disability and /or death. It can cause constipation, nausea, itching, swelling among other side effects. Please report any concerns to the doctor immediately. IT should not be taken with other prescription pain medications , anxiety or \"nerve pills-if so you need to make sure you have a prescription for [facility], a reversal agent of narcotic pain medication. You must take as directed and if you are not hurting dont take the medication. IT works better and less likely to cause side effects or adverse health outcomes if you take it sparingly. Consider tapering down the dose if not in process already. For instance, try to take one less per day every day or two. You need to bring the pills to [facility] and  will also be subject to Random urine drug screens and pill counts. DO NOT mix with alcohol. Failure to comply with recommendation could result in bodily harm and/or death. Failure to comply may also result in dismissal from our medical practice and discontinuation of the medicine. At that time you will be asked to taper your medication by one tablet every 3 days. Please do not hesitate to ask any questions, especially if you do not understand the regulations for you and our practice are subject to them.

## 2021-04-26 NOTE — PROGRESS NOTES
IDENTIFYING INFORMATION:  Galo Cunninghamlaura Luna , 48 y.o., female  [address] [address]     CHIEF COMPLAINT:     Chief Complaint   Patient presents with    Behavioral Problem    Diabetes    Sleep Problem     HISTORY OF PRESENT ILLNESS:    Barbara Yang is a 48 y.o. female  has a past medical history of DVT (deep venous thrombosis) ([address] Low back pain, and Pulmonary emboli ([address] .  she comes in for 6 month follow up and having some right trap/back pain. Does get DC adjustment and has to take hydrocodone prn. Does well without dizziness, drowsiness or constipation. No nausea. It does allow her to function at work and home. Rarely take  Zolpidem, on the night shift. Has some issues gettng INR just right , gettng closer, no bruising or bleeding. She also takes Adderall for ADHD. She has a job that requires her to focus. The medication does help. She denies any changes in appetite or weight. She has no chest pain or palpitations no anxiety no dizziness. She has no anxiety or shakiness with the medicine. She denies any headaches or abdominal pain. PAST MEDICAL HISTORY:     Past Medical History:   Diagnosis Date    [facility] thrombosis) [facility])     Low back pain     Pulmonary emboli ([facility])        MEDICATIONS:     Current Outpatient Medications on File Prior to Visit   Medication Sig Dispense Refill    dextroamphetamine-amphetamine (ADDERALL) 20 mg tablet Take 20 mg by mouth three (3) times daily.  HYDROcodone-acetaminophen (NORCO)  mg tablet Take 1 Tab by mouth every six (6) hours as needed for Pain for up to 30 days. Max Daily Amount: 4 Tabs. 120 Tab 0    zolpidem (AMBIEN) 10 mg tablet Take 1 Tab by mouth nightly as needed for Sleep. Max Daily Amount: 10 mg. 30 Tab 3    warfarin (COUMADIN) 5 mg tablet Take 1 Tab by mouth daily. 30 Tab 5    warfarin (COUMADIN) 7.5 mg tablet Take 1 Tab by mouth daily for 180 days.  90 Tab 1    metFORMIN (GLUCOPHAGE) 1,000 mg tablet TAKE 1 TABLET BY MOUTH TWICE DAILY BEFORE MEALS 180 Tab 3    warfarin (COUMADIN) 10 mg tablet TAKE 1 TABLET BY MOUTH EVERY DAY 30 Tab 5    lancets (OneTouch Delica Plus Lancet) 33 gauge misc by Does Not Apply route.  [DISCONTINUED] dextroamphetamine-amphetamine (ADDERALL) 30 mg tablet Take 1 Tab by mouth two (2) times a day. Max Daily Amount: 2 Tabs. New dose and frequency 60 Tab 0     No current facility-administered medications on file prior to visit. ALLERGIES:    No Known Allergies      SOCIAL HISTORY:     Social History     Tobacco Use    Smoking status: Former Smoker    Smokeless tobacco: Never Used   Substance Use Topics    Alcohol use: Not Currently    Drug use: Never       SURGICAL HISTORY:    History reviewed. No pertinent surgical history. FAMILY HISTORY:    Family History   Problem Relation Age of Onset    No Known Problems Mother     No Known Problems Father          REVIEW OF SYSTEMS:    I personally collected this information from all available source present (patient/others in room and records available) -[email or web address]    Review of Systems   Constitutional: Positive for malaise/fatigue. Negative for chills, diaphoresis, fever and weight loss. HENT: Negative for congestion, ear pain, hearing loss, nosebleeds, sinus pain, sore throat and tinnitus. Eyes: Negative for blurred vision, double vision, photophobia and pain. Respiratory: Negative for cough, sputum production and shortness of breath. Cardiovascular: Negative for chest pain, palpitations, orthopnea, claudication, leg swelling and PND. Gastrointestinal: Negative for abdominal pain, blood in stool, constipation, diarrhea, heartburn, melena, nausea and vomiting. Genitourinary: Negative for dysuria, frequency and urgency. Musculoskeletal: Positive for back pain, joint pain and myalgias. Negative for falls and neck pain. Skin: Negative for itching and rash.    Neurological: Negative for dizziness, tingling, tremors, sensory change, focal weakness and headaches. Psychiatric/Behavioral: Negative for depression and suicidal ideas. The patient has insomnia. The patient is not nervous/anxious. PHYSICAL EXAMINATION:    Vital Signs:    Visit Vitals  /89 (BP 1 Location: Left upper arm, BP Patient Position: Sitting)   Pulse 99   Temp 97.3 °F (36.3 °C) (Temporal)   Ht (P) 5' 5.5\" (1.664 m)   Wt 200 lb (90.7 kg)   SpO2 98%   BMI (P) 32.78 kg/m²         Wt Readings from Last 3 Encounters:   04/26/21 200 lb (90.7 kg)   03/30/21 200 lb (90.7 kg)   03/16/21 202 lb (91.6 kg)     BP Readings from Last 3 Encounters:   04/26/21 137/89   03/30/21 123/76   03/16/21 136/81         Physical Exam  Nursing note reviewed. Constitutional:       General: She is not in acute distress. Appearance: Normal appearance. She is not ill-appearing or toxic-appearing. HENT:      Right Ear: Tympanic membrane, ear canal and external ear normal.      Left Ear: Tympanic membrane, ear canal and external ear normal.      Nose: No congestion or rhinorrhea. Mouth/Throat:      Pharynx: No oropharyngeal exudate or posterior oropharyngeal erythema. Eyes:      General: No scleral icterus. Extraocular Movements: Extraocular movements intact. Conjunctiva/sclera: Conjunctivae normal.      Pupils: Pupils are equal, round, and reactive to light. Neck:      Musculoskeletal: No neck rigidity or muscular tenderness. Vascular: No carotid bruit. Cardiovascular:      Rate and Rhythm: Normal rate and regular rhythm. Heart sounds: No murmur. No friction rub. No gallop. Pulmonary:      Effort: Pulmonary effort is normal.      Breath sounds: Normal breath sounds. No wheezing, rhonchi or rales. Abdominal:      General: Bowel sounds are normal. There is no distension. Palpations: Abdomen is soft. There is no mass. Tenderness: There is no abdominal tenderness. Musculoskeletal:         General: Deformity present.  No swelling. Right lower leg: No edema. Left lower leg: No edema. Comments: Thoracolumbar spine shows no abnormal curvatures. There are no scarring or rashes. The left L2-3-4 are very tight and tender. Forward flexion is very uncomfortable. To about 60 degrees. Sidebending rotation are decreased and painful. Knees are tender in each medial compartment. Range of motion is normal with no crepitance. There are no cords or tenderness no edema or cyanosis. Lymphadenopathy:      Cervical: No cervical adenopathy. Skin:     Capillary Refill: Capillary refill takes less than 2 seconds. Coloration: Skin is not jaundiced. Findings: No erythema or rash. Neurological:      General: No focal deficit present. Mental Status: She is alert and oriented to person, place, and time. Mental status is at baseline. Cranial Nerves: No cranial nerve deficit. Sensory: No sensory deficit. Coordination: Coordination normal.      Gait: Gait normal.   Psychiatric:         Mood and Affect: Mood normal.         Behavior: Behavior normal.         Thought Content: Thought content normal.         Judgment: Judgment normal.           ASSESSMENT/PLAN:    Discussion (regarding today's visit with Cecile Claude); Surekha Bullock continues to utilize hydrocodone for chronic pain. She is up-to-date on refills. The past medical records and monitoring program have been reviewed. There are no indicators for aberrant behavior. She does take a benzo in the form of zolpidem. She takes it at bedtime on the night shift so she can sleep during the daylight hours. 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 She is not able to take any nonsteroidals. She continues warfarin and will continue the hydrocodone as she is using it appropriately.   Her morphine milligram equivalent is 40 and she does have a history of chronic DVTs and is afraid to stop the warfarin. 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 She has no side effects and continues to meet criteria for ADHD. We did order some lab work and will follow up with these results and treat as indicated. Her INR is very close so we will recheck that in a couple weeks to see if there are any changes because we barely increased it several months ago and it went above 3.5 which is increased risk for bleeding. We would like to keep her less than 3 but higher than 2.0. ICD-10-CM ICD-9-CM    1. Chronic deep vein thrombosis (DVT) of both lower extremities, unspecified vein ([facility])  I82.503 453.50 AMB POC PT/INR      CBC WITH AUTOMATED DIFF   2. Attention deficit hyperactivity disorder (ADHD), predominantly inattentive type  F90.0 314.00    3. Pain in both lower extremities  M79.604 729.5 CBC WITH AUTOMATED DIFF    M79.605  TOXASSURE SELECT 13 (MW)   4. Primary insomnia  F51.01 307.42    5. Controlled type 2 diabetes mellitus without complication, without long-term current use of insulin ([facility])  E11.9 250.00 HEMOGLOBIN A1C WITH EAG      METABOLIC PANEL, COMPREHENSIVE      LIPID PANEL     WE also addressed the following items;  WE reviewed each diagnosis listed for today's visit including medications, treatment, testing such as labs, imagine, referrals and when to call regarding results and appointments. Reminded patient to keep any and all appointments with specialists, labs, imaging. Reminded patient to make sure we get copies of any specialists care, labs and imaging. Reminded patient to call of come by the office if there are any concerns, questions , comments or problems. The patient verbalized understanding of the care plan and all questions were answered to the patient's satisfaction prior to leaving the office.    The patient was told that failure to comply with recommended testing could result in abnormal health consequences. The patient was instructed to have yearly routine health maintenance including but not limited to age appropriate vaccines, testing, screening exams. ALL questions were answered to her satisfaction before leaving the office. The patient actively participated in medical decision making. FOLLOW UP:     Patient knows to keep any and all future visits scheduled unless told otherwise. Patient knows to call, come back if any concerns, questions, comments or problems arise. Follow-up and Dispositions    · Return in about 6 months (around 10/26/2021) for Follow up diabetes, back, leg and shoulder. This visit may have been completed , in part, with voice recognition software as well as typing and may have syntax errors despite editing.       Carmen Marie, DO

## 2021-04-26 NOTE — PROGRESS NOTES
1. Have you been to the ER, urgent care clinic since your last visit? Hospitalized since your last visit? No    2. Have you seen or consulted any other health care providers outside of the [address] [place] [address] since your last visit? Include any pap smears or colon screening.  No    Chief Complaint   Patient presents with    Behavioral Problem    Diabetes    Sleep Problem     Visit Vitals  BP (!) 139/91 (BP 1 Location: Left upper arm, BP Patient Position: Sitting)   Pulse (!) 112   Temp 97.3 °F (36.3 °C) (Temporal)   Ht (P) 5' 5.5\" (1.664 m)   Wt 200 lb (90.7 kg)   SpO2 98%   BMI (P) 32.78 kg/m²

## 2021-04-29 LAB — DRUGS UR: NORMAL

## 2021-05-18 ENCOUNTER — TELEPHONE (OUTPATIENT)
Dept: FAMILY MEDICINE CLINIC | Age: 51
End: 2021-05-18

## 2021-05-18 DIAGNOSIS — M79.604 PAIN IN BOTH LOWER EXTREMITIES: ICD-10-CM

## 2021-05-18 DIAGNOSIS — M79.605 PAIN IN BOTH LOWER EXTREMITIES: ICD-10-CM

## 2021-05-18 DIAGNOSIS — F90.0 ATTENTION DEFICIT HYPERACTIVITY DISORDER (ADHD), PREDOMINANTLY INATTENTIVE TYPE: Primary | ICD-10-CM

## 2021-05-19 RX ORDER — HYDROCODONE BITARTRATE AND ACETAMINOPHEN 10; 325 MG/1; MG/1
1 TABLET ORAL
Qty: 120 TABLET | Refills: 0 | Status: SHIPPED | OUTPATIENT
Start: 2021-05-19 | End: 2021-06-18

## 2021-05-19 RX ORDER — DEXTROAMPHETAMINE SACCHARATE, AMPHETAMINE ASPARTATE, DEXTROAMPHETAMINE SULFATE AND AMPHETAMINE SULFATE 5; 5; 5; 5 MG/1; MG/1; MG/1; MG/1
20 TABLET ORAL 3 TIMES DAILY
Qty: 90 TABLET | Refills: 0 | Status: SHIPPED | OUTPATIENT
Start: 2021-05-19 | End: 2021-06-17 | Stop reason: SDUPTHER

## 2021-05-19 NOTE — TELEPHONE ENCOUNTER
Identifying Data:  48 y.o. , Julian Rodriguez , female     HISTORICAL DATA (REVIEWED TODAY):  ALLERGIES-    No Known Allergies    MEDICATION AS OF LAST RECONCILIATION (NOT INCLUDING CHANGES MADE TODAY):    Current Outpatient Medications on File Prior to Visit   Medication Sig Dispense Refill    [DISCONTINUED] dextroamphetamine-amphetamine (ADDERALL) 20 mg tablet Take 20 mg by mouth three (3) times daily.  zolpidem (AMBIEN) 10 mg tablet Take 1 Tab by mouth nightly as needed for Sleep. Max Daily Amount: 10 mg. 30 Tab 3    warfarin (COUMADIN) 5 mg tablet Take 1 Tab by mouth daily. 30 Tab 5    warfarin (COUMADIN) 7.5 mg tablet Take 1 Tab by mouth daily for 180 days. 90 Tab 1    metFORMIN (GLUCOPHAGE) 1,000 mg tablet TAKE 1 TABLET BY MOUTH TWICE DAILY BEFORE MEALS 180 Tab 3    warfarin (COUMADIN) 10 mg tablet TAKE 1 TABLET BY MOUTH EVERY DAY 30 Tab 5    lancets (OneTouch Delica Plus Lancet) 33 gauge misc by Does Not Apply route. No current facility-administered medications on file prior to visit. PAST MEDICAL HISTORY:    Patient Active Problem List   Diagnosis Code    Pain in both lower extremities M79.604, M79.605    Attention deficit hyperactivity disorder (ADHD), predominantly inattentive type F90.0    Pulmonary emboli ([facility]) I26.99    DVT (deep venous thrombosis) ([facility]) I82.409    Low back pain M54.5       ASSESSMENT AND PLAN:      ICD-10-CM ICD-9-CM    1. Attention deficit hyperactivity disorder (ADHD), predominantly inattentive type  F90.0 314.00 dextroamphetamine-amphetamine (ADDERALL) 20 mg tablet   2. Pain in both lower extremities  M79.604 729.5 HYDROcodone-acetaminophen (NORCO)  mg tablet    M79.605     Patient continues to utilize hydrocodone for chronic pain. Morphine milligram equivalent is 40. She does take zolpidem occasionally. 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  She is also not able to take nonsteroidals for long period of time or at all really because of her chronic DVT and pulmonary emboli and is on the warfarin therapy chronically. The medicine does help and add to her quality of life. She also takes Adderall for ADHD and has a lot of trouble focusing if she does not especially at her job the medicine helps her focus and allows her workflow to be at the desired rate and efficiency.           Sharmin Silva, DO

## 2021-06-14 NOTE — PROGRESS NOTES
Called and informed patient regarding controlled substances. She can make an appointment with available provider and discuss meds and referrals if indicated. She verbalized understanding.    6-14-21  Joe Syed, DO

## 2021-06-17 NOTE — PATIENT INSTRUCTIONS
Dear Mrs Lilo Velez, This letter serves as notice for changes on care for your ADHD, Insomnia, and chronic management. We will no longer be able to prescribe Hydrocodone, Adderall, Ambien for you I saw you on 6/18/21 for evaluation and treatment of your Insomnia, ADHD, chronic pain. During the visit we discussed your current treatment plan as well as follow-up care. At the time of the visit I provided you with a 30-day taper of your current medications as follows: Adderall 1 tablet 2 times per day for 2 weeks, then 1 tablet daily for 7 days, then 1 tablet every other day for 6 days, then stop. Hydrocodone 1 tablet 3 times per day for 7 days, then 1 tablet 2 times per day for 7 days, then 1 tablet daily for 7 days, then 1 tablet every other day, then stop. Ambien 1 tablet at bedtime for 14 days, then 1 tablet every other night for 14 days, then stop May complement with vistaril 25 mg 3 times per day as needed for anxiety, insomnia. Patient has also been provided with a list of pain specialist in the area, as well as referral for Pain management and psych. Patient has been informed that she is to practice due diligence in contacting insurance and specialist to establish care with pain specialist to continue chronic pain management as this will be the last prescriptions for chronic pain, ADHD, Insomnia management provided by this clinic. Patient also advised If you need help coming off these medications, please call one of the following numbers: 
 
[facility] [phone] [facility] [phone] [facility] [phone] Or you may call or go to your local emergency room.

## 2021-06-17 NOTE — PROGRESS NOTES
Jhonatan Michael (: 1970) is a 48 y.o. female, established patient, here for evaluation of the following chief complaint(s):  Follow-up, Behavioral Problem, Medication Refill, Back Pain, Leg Pain, Shoulder Pain, Diabetes, Neck Pain, Anticoagulation, and Insomnia      ASSESSMENT/PLAN:  Below is the assessment and plan developed based on review of pertinent history, physical exam, labs, studies, and medications. 1. Chronic deep vein thrombosis (DVT) of both lower extremities, unspecified vein (HCC)  -     AMB POC PT/INR  -     TOXASSURE SELECT 13 (MW)  -     hydrOXYzine pamoate (VISTARIL) 25 mg capsule; Take 1 Capsule by mouth three (3) times daily as needed for Anxiety, Sleep or Agitation for up to 90 days. , Normal, Disp-90 Capsule, R-2  2. Attention deficit hyperactivity disorder (ADHD), predominantly inattentive type  -     dextroamphetamine-amphetamine (ADDERALL) 15 mg tablet; Take 1 Tablet by mouth two (2) times a day for 14 days, THEN 1 Tablet daily for 7 days, THEN 1 Tablet every other day for 6 days. Max Daily Amount: 30 mg. Ok to dispense in 3 separate prescriptions over the next 3 months. Indications: attention deficit disorder with hyperactivity, Normal, Disp-38 Tablet, R-0Ok to dispense in 3 separate prescriptions over the next 3 months. Tapering off medication.  -     REFERRAL TO PSYCHIATRY  -     naloxone (Narcan) 4 mg/actuation nasal spray; Use 1 spray intranasally, then discard. Repeat with new spray every 2 min as needed for opioid overdose symptoms, alternating nostrils. Indications: decrease in rate & depth of breathing due to opioid drug, Normal, Disp-2 Each, R-0  -     TOXASSURE SELECT 13 (MW)  -     hydrOXYzine pamoate (VISTARIL) 25 mg capsule; Take 1 Capsule by mouth three (3) times daily as needed for Anxiety, Sleep or Agitation for up to 90 days. , Normal, Disp-90 Capsule, R-2  3. Pain in both lower extremities  -     HYDROcodone-acetaminophen (NORCO) 5-325 mg per tablet;  Take 1 Tablet by mouth every eight (8) hours as needed for Pain for 7 days, THEN 1 Tablet two (2) times daily as needed for 7 days, THEN 1 Tablet daily as needed for 7 days, THEN 1 Tablet every other day for 6 days. Max Daily Amount: 3 Tablets. Then stop, tapering off medication, Normal, Disp-45 Tablet, R-0  -     REFERRAL TO PAIN MANAGEMENT  -     naloxone (Narcan) 4 mg/actuation nasal spray; Use 1 spray intranasally, then discard. Repeat with new spray every 2 min as needed for opioid overdose symptoms, alternating nostrils. Indications: decrease in rate & depth of breathing due to opioid drug, Normal, Disp-2 Each, R-0  -     TOXASSURE SELECT 13 (MW)  -     hydrOXYzine pamoate (VISTARIL) 25 mg capsule; Take 1 Capsule by mouth three (3) times daily as needed for Anxiety, Sleep or Agitation for up to 90 days. , Normal, Disp-90 Capsule, R-2  4. Primary insomnia  -     zolpidem (AMBIEN) 5 mg tablet; Take 1 Tablet by mouth nightly as needed for Sleep for 14 days, THEN 1 Tablet every other day for 14 days. Max Daily Amount: 5 mg. Then stop. Tapering off medication. Indications: difficulty falling asleep, Normal, Disp-21 Tablet, R-0  -     REFERRAL TO PSYCHIATRY  -     naloxone (Narcan) 4 mg/actuation nasal spray; Use 1 spray intranasally, then discard. Repeat with new spray every 2 min as needed for opioid overdose symptoms, alternating nostrils. Indications: decrease in rate & depth of breathing due to opioid drug, Normal, Disp-2 Each, R-0  -     TOXASSURE SELECT 13 (MW)  -     hydrOXYzine pamoate (VISTARIL) 25 mg capsule; Take 1 Capsule by mouth three (3) times daily as needed for Anxiety, Sleep or Agitation for up to 90 days. , Normal, Disp-90 Capsule, R-2      comprehensive physical exam and review of medical history was performed today, including a mental status evaluation to determine nature and extent of pain and any substance abuse risk or history   Jeanne Groves of the pain     ICD-10-CM ICD-9-CM    1.  Chronic deep vein thrombosis (DVT) of both lower extremities, unspecified vein (HCC)  I82.503 453.50 AMB POC PT/INR      TOXASSURE SELECT 13 (MW)      hydrOXYzine pamoate (VISTARIL) 25 mg capsule   2. Attention deficit hyperactivity disorder (ADHD), predominantly inattentive type  F90.0 314.00 dextroamphetamine-amphetamine (ADDERALL) 15 mg tablet      REFERRAL TO PSYCHIATRY      naloxone (Narcan) 4 mg/actuation nasal spray      TOXASSURE SELECT 13 (MW)      hydrOXYzine pamoate (VISTARIL) 25 mg capsule   3. Pain in both lower extremities  M79.604 729.5 HYDROcodone-acetaminophen (NORCO) 5-325 mg per tablet    M79.605  REFERRAL TO PAIN MANAGEMENT      naloxone (Narcan) 4 mg/actuation nasal spray      TOXASSURE SELECT 13 (MW)      hydrOXYzine pamoate (VISTARIL) 25 mg capsule   4. Primary insomnia  F51.01 307.42 zolpidem (AMBIEN) 5 mg tablet      REFERRAL TO PSYCHIATRY      naloxone (Narcan) 4 mg/actuation nasal spray      TOXASSURE SELECT 13 (MW)      hydrOXYzine pamoate (VISTARIL) 25 mg capsule     Pain extend with opioid 4-5 without opioid 8-9  Substance abuse history none   checked 6/18/21 40 MME/day Last fill date opioid 5/19/21, Ambien 5/19/21, stimulant  5/19/21  UDS completed today, will follow up with results when available  justification for continued opioid therapy chronic pain on controlled substance chronic pain that will be taper off over 30 days. evaluated for signs of substance abuse disorder was negative  signed treatment agreement for chronic pain on 6/18/21 along with documentation of informed consent 6/18/21     Patient here today for evaluation and treatment of chronic pain, Insomnia, ADHD. Today's we discussed her current treatment as well as follow-up care.        At this time patient I will  provided with a tapering dose schedule for her chronic pain management due to the extent of the pain and disability on the patient, I have provided the following taper for the patient:    Adderall 1 tablet 2 times per day for 2 weeks, then 1 tablet daily for 7 days, then 1 tablet every other day for 6 days, then stop. Hydrocodone 1 tablet 3 times per day for 7 days, then 1 tablet 2 times per day for 7 days, then 1 tablet daily for 7 days, then 1 tablet every other day, then stop. Ambien 1 tablet at bedtime for 14 days, then 1 tablet every other night for 14 days, then stop    May complement with vistaril 25 mg 3 times per day as needed for anxiety, insomnia, agitation. Patient has also been provided with a list of pain specialist in the area, as well as referral for Pain management and psych. Patient has been informed that she is to practice due diligence in contacting insurance and specialist to establish care with pain specialist to continue chronic pain management as this will be the last prescriptions for chronic pain, ADHD, Insomnia management provided by this clinic. Patient also advised If you need help coming off these medications, please call one of the following numbers:    [facility] [phone]    [facility] [phone]    [facility] [phone]    Or you may call or go to your local emergency room. Patient verbalized understanding and agreed with plan. Patient also provided with letter detailing tapering      No follow-ups on file. SUBJECTIVE/OBJECTIVE:  BELGICA Rios is a 48 y.o. female  has a past medical history of DVT (deep venous thrombosis) ([facility] [address] Low back pain, and Pulmonary emboli ([facility] [address] .  she comes in for 3 month follow up and having some right trap/back pain. Does get DC adjustment and has to take hydrocodone prn. Does well without dizziness, drowsiness or constipation. No nausea. It does allow her to function at work and home. Has some issues gettng INR just right , gettng closer, no bruising or bleeding. She also takes Adderall for ADHD. She has a job that requires her to focus. The medication does help.   She denies any changes in appetite or weight. She has no chest pain or palpitations no anxiety no dizziness. She has no anxiety or shakiness with the medicine. She denies any headaches or abdominal pain. Follow-up (Patient of Dr. Asuncion Narayan - Medication refill Diabetes, back, leg, and shoulder - patient staates she needs PT / INR as well ), Behavioral Problem, Medication Refill, Back Pain (Goes to chiropractor once every 2 weeks ), Leg Pain (Right knee cap pain ), Shoulder Pain (Right shoulder (seen by Dr. Danya Savage at [facility] [address]) ), Diabetes (Patient has not compelted her labs - she generally completes her labs at work but has not been able to due to [facility].  ), Neck Pain, Anticoagulation (History of DVT    INR 2.44  PT 28.9  Warfarin 7.5 on Wed and Sat and takes 5mg the rest of the week. ), and Insomnia Rarely take  Zolpidem, on the night shift. Review of Systems   All other systems reviewed and are negative. Visit Vitals  /77 (BP 1 Location: Right arm, BP Patient Position: Sitting, BP Cuff Size: Adult)   Pulse (!) 106   Temp 98.6 °F (37 °C) (Temporal)   Resp 18   Ht 5' 6.5\" (1.689 m)   Wt 200 lb (90.7 kg)   LMP 06/18/2021   SpO2 98%   BMI 31.80 kg/m²         Physical Exam  Vitals and nursing note reviewed. Exam conducted with a chaperone present. Constitutional:       General: She is not in acute distress. Appearance: Normal appearance. She is not ill-appearing or toxic-appearing. HENT:      Right Ear: Tympanic membrane, ear canal and external ear normal.      Left Ear: Tympanic membrane, ear canal and external ear normal.      Nose: No congestion or rhinorrhea. Mouth/Throat:      Pharynx: No oropharyngeal exudate or posterior oropharyngeal erythema. Eyes:      General: No scleral icterus. Extraocular Movements: Extraocular movements intact. Conjunctiva/sclera: Conjunctivae normal.      Pupils: Pupils are equal, round, and reactive to light.    Neck:      Musculoskeletal: No neck rigidity or muscular tenderness. Vascular: No carotid bruit. Cardiovascular:      Rate and Rhythm: Normal rate and regular rhythm. Heart sounds: No murmur. No friction rub. No gallop. Pulmonary:      Effort: Pulmonary effort is normal.      Breath sounds: Normal breath sounds. No wheezing, rhonchi or rales. Abdominal:      General: Bowel sounds are normal. There is no distension. Palpations: Abdomen is soft. There is no mass. Tenderness: There is no abdominal tenderness. Musculoskeletal:         General: Deformity present. No swelling. Right lower leg: No edema. Left lower leg: No edema. Comments: Thoracolumbar spine shows no abnormal curvatures. There are no scarring or rashes. The left L2-3-4 are very tight and tender. Forward flexion is very uncomfortable. To about 60 degrees. Sidebending rotation are decreased and painful. Knees are tender in each medial compartment. Range of motion is normal with no crepitance. There are no cords or tenderness no edema or cyanosis. Lymphadenopathy:      Cervical: No cervical adenopathy. Skin:     Capillary Refill: Capillary refill takes less than 2 seconds. Coloration: Skin is not jaundiced. Findings: No erythema or rash. Neurological:      General: No focal deficit present. Mental Status: She is alert and oriented to person, place, and time. Mental status is at baseline. Cranial Nerves: No cranial nerve deficit. Sensory: No sensory deficit. Coordination: Coordination normal.      Gait: Gait normal.   Psychiatric:         Mood and Affect: Mood normal.         Behavior: Behavior normal.         Thought Content:  Thought content normal.         Judgment: Judgment normal.       On this date 06/18/2021 I have spent 41 minutes reviewing previous notes, test results and face to face with the patient discussing the diagnosis and importance of compliance with the treatment plan as well as documenting on the day of the visit. An electronic signature was used to authenticate this note.   -- Raf Vergara NP

## 2021-06-18 ENCOUNTER — OFFICE VISIT (OUTPATIENT)
Dept: FAMILY MEDICINE CLINIC | Age: 51
End: 2021-06-18
Payer: COMMERCIAL

## 2021-06-18 VITALS
HEART RATE: 106 BPM | WEIGHT: 200 LBS | OXYGEN SATURATION: 98 % | RESPIRATION RATE: 18 BRPM | BODY MASS INDEX: 31.39 KG/M2 | SYSTOLIC BLOOD PRESSURE: 120 MMHG | DIASTOLIC BLOOD PRESSURE: 77 MMHG | TEMPERATURE: 98.6 F | HEIGHT: 67 IN

## 2021-06-18 DIAGNOSIS — M79.605 PAIN IN BOTH LOWER EXTREMITIES: ICD-10-CM

## 2021-06-18 DIAGNOSIS — M79.604 PAIN IN BOTH LOWER EXTREMITIES: ICD-10-CM

## 2021-06-18 DIAGNOSIS — F51.01 PRIMARY INSOMNIA: ICD-10-CM

## 2021-06-18 DIAGNOSIS — F90.0 ATTENTION DEFICIT HYPERACTIVITY DISORDER (ADHD), PREDOMINANTLY INATTENTIVE TYPE: ICD-10-CM

## 2021-06-18 DIAGNOSIS — I82.503 CHRONIC DEEP VEIN THROMBOSIS (DVT) OF BOTH LOWER EXTREMITIES, UNSPECIFIED VEIN (HCC): Primary | ICD-10-CM

## 2021-06-18 LAB
INR BLD: 2.4
PT POC: 28.9 SECONDS
VALID INTERNAL CONTROL?: YES

## 2021-06-18 PROCEDURE — 99215 OFFICE O/P EST HI 40 MIN: CPT | Performed by: NURSE PRACTITIONER

## 2021-06-18 PROCEDURE — 85610 PROTHROMBIN TIME: CPT | Performed by: NURSE PRACTITIONER

## 2021-06-18 RX ORDER — WARFARIN 7.5 MG/1
7.5 TABLET ORAL DAILY
COMMUNITY
End: 2021-07-13

## 2021-06-18 RX ORDER — HYDROCODONE BITARTRATE AND ACETAMINOPHEN 5; 325 MG/1; MG/1
TABLET ORAL
Qty: 45 TABLET | Refills: 0 | Status: SHIPPED | OUTPATIENT
Start: 2021-06-18 | End: 2021-07-15

## 2021-06-18 RX ORDER — NALOXONE HYDROCHLORIDE 4 MG/.1ML
SPRAY NASAL
Qty: 2 EACH | Refills: 0 | Status: SHIPPED | OUTPATIENT
Start: 2021-06-18 | End: 2022-05-16 | Stop reason: ALTCHOICE

## 2021-06-18 RX ORDER — HYDROXYZINE PAMOATE 25 MG/1
25 CAPSULE ORAL
Qty: 90 CAPSULE | Refills: 2 | Status: SHIPPED | OUTPATIENT
Start: 2021-06-18 | End: 2021-09-03

## 2021-06-18 RX ORDER — DEXTROAMPHETAMINE SACCHARATE, AMPHETAMINE ASPARTATE, DEXTROAMPHETAMINE SULFATE AND AMPHETAMINE SULFATE 3.75; 3.75; 3.75; 3.75 MG/1; MG/1; MG/1; MG/1
TABLET ORAL
Qty: 38 TABLET | Refills: 0 | Status: SHIPPED | OUTPATIENT
Start: 2021-06-18 | End: 2021-07-15

## 2021-06-18 RX ORDER — ZOLPIDEM TARTRATE 5 MG/1
TABLET ORAL
Qty: 21 TABLET | Refills: 0 | Status: SHIPPED | OUTPATIENT
Start: 2021-06-18 | End: 2021-07-16

## 2021-06-18 NOTE — LETTER
NOTIFICATION CHANGE FOR CONTROLLED SUBSTANCE MANAGEMENT 
 
6/18/2021 8:56 AM 
 
Dear Mrs Wade Singh, This letter serves as notice for changes on care for your ADHD, Insomnia, and chronic management. We will no longer be able to prescribe Hydrocodone, Adderall, Ambien for you I saw you on 6/18/21 for evaluation and treatment of your Insomnia, ADHD, chronic pain. During the visit we discussed your current treatment plan as well as follow-up care. At the time of the visit I provided you with a 30-day taper of your current medications as follows: Adderall 1 tablet 2 times per day for 2 weeks, then 1 tablet daily for 7 days, then 1 tablet every other day for 6 days, then stop. Hydrocodone 1 tablet 3 times per day for 7 days, then 1 tablet 2 times per day for 7 days, then 1 tablet daily for 7 days, then 1 tablet every other day, then stop. Ambien 1 tablet at bedtime for 14 days, then 1 tablet every other night for 14 days, then stop May complement with vistaril 25 mg 3 times per day as needed for anxiety, insomnia. Patient has also been provided with a list of pain specialist in the area, as well as referral for Pain management and psych. Patient has been informed that she is to practice due diligence in contacting insurance and specialist to establish care with pain specialist to continue chronic pain management as this will be the last prescriptions for chronic pain, ADHD, Insomnia management provided by this clinic. Patient also advised If you need help coming off these medications, please call one of the following numbers: 
 
[facility] [phone] [facility] [phone] [facility] [phone] Or you may call or go to your local emergency room. Sincerely, Giselle Bui NP 
 
I, Stevenson Prieto hereby acknowledge that I have received and understand the contents of this letter. ________________________________________________________ SIGNATURE                                                           DATE

## 2021-06-18 NOTE — PROGRESS NOTES
Chief Complaint   Patient presents with    Follow-up     Patient of Dr. Katarina Babb - Medication refill Diabetes, back, leg, and shoulder - patient staates she needs PT / INR as well     Behavioral Problem    Medication Refill    Back Pain     Goes to chiropractor once every 2 weeks     Leg Pain     Right knee cap pain     Shoulder Pain     Right shoulder (seen by Dr. Kentrell Glover at [facility] [address])     Diabetes     Patient has not compelted her labs - she generally completes her labs at work but has not been able to due to [facility].  Neck Pain    Anticoagulation     History of DVT      1. Have you been to the ER, urgent care clinic since your last visit? Hospitalized since your last visit? No    2. Have you seen or consulted any other health care providers outside of the [address] [place] [address] since your last visit? Include any pap smears or colon screening.  No     Visit Vitals  /77 (BP 1 Location: Right arm, BP Patient Position: Sitting, BP Cuff Size: Adult)   Pulse (!) 112   Temp 98.6 °F (37 °C) (Temporal)   Resp 18   Ht 5' 6.5\" (1.689 m)   Wt 200 lb (90.7 kg)   LMP 06/18/2021   SpO2 98%   BMI 31.80 kg/m²     Visit Vitals  /77 (BP 1 Location: Right arm, BP Patient Position: Sitting, BP Cuff Size: Adult)   Pulse (!) 106   Temp 98.6 °F (37 °C) (Temporal)   Resp 18   Ht 5' 6.5\" (1.689 m)   Wt 200 lb (90.7 kg)   LMP 06/18/2021   SpO2 98%   BMI 31.80 kg/m²

## 2021-06-21 ENCOUNTER — TELEPHONE (OUTPATIENT)
Dept: FAMILY MEDICINE CLINIC | Age: 51
End: 2021-06-21

## 2021-06-21 NOTE — TELEPHONE ENCOUNTER
Patient needs a refill for Adderrall 15 mg tablets and the pharmacy wants to confirm the changed done to the medication

## 2021-06-24 LAB — DRUGS UR: NORMAL

## 2021-07-13 RX ORDER — WARFARIN 7.5 MG/1
TABLET ORAL
Qty: 90 TABLET | Refills: 1 | Status: SHIPPED | OUTPATIENT
Start: 2021-07-13 | End: 2022-01-18

## 2021-07-23 ENCOUNTER — CLINICAL SUPPORT (OUTPATIENT)
Dept: FAMILY MEDICINE CLINIC | Age: 51
End: 2021-07-23
Payer: COMMERCIAL

## 2021-07-23 DIAGNOSIS — I26.99 PULMONARY EMBOLISM, UNSPECIFIED CHRONICITY, UNSPECIFIED PULMONARY EMBOLISM TYPE, UNSPECIFIED WHETHER ACUTE COR PULMONALE PRESENT (HCC): Primary | ICD-10-CM

## 2021-07-23 LAB
INR BLD: 1.7
PT POC: 20.8 SECONDS
VALID INTERNAL CONTROL?: YES

## 2021-07-23 PROCEDURE — 85610 PROTHROMBIN TIME: CPT | Performed by: FAMILY MEDICINE

## 2021-07-23 PROCEDURE — 93793 ANTICOAG MGMT PT WARFARIN: CPT | Performed by: FAMILY MEDICINE

## 2021-08-19 ENCOUNTER — CLINICAL SUPPORT (OUTPATIENT)
Dept: FAMILY MEDICINE CLINIC | Age: 51
End: 2021-08-19
Payer: COMMERCIAL

## 2021-08-19 VITALS
TEMPERATURE: 97.7 F | WEIGHT: 192 LBS | DIASTOLIC BLOOD PRESSURE: 72 MMHG | HEIGHT: 67 IN | OXYGEN SATURATION: 98 % | HEART RATE: 78 BPM | SYSTOLIC BLOOD PRESSURE: 118 MMHG | BODY MASS INDEX: 30.13 KG/M2

## 2021-08-19 DIAGNOSIS — I26.99 PULMONARY EMBOLISM, UNSPECIFIED CHRONICITY, UNSPECIFIED PULMONARY EMBOLISM TYPE, UNSPECIFIED WHETHER ACUTE COR PULMONALE PRESENT (HCC): Primary | ICD-10-CM

## 2021-08-19 LAB
INR BLD: 2.3
PT POC: 27.8 SECONDS
VALID INTERNAL CONTROL?: YES

## 2021-08-19 PROCEDURE — 85610 PROTHROMBIN TIME: CPT | Performed by: FAMILY MEDICINE

## 2021-08-19 PROCEDURE — 96372 THER/PROPH/DIAG INJ SC/IM: CPT | Performed by: FAMILY MEDICINE

## 2021-08-19 NOTE — TELEPHONE ENCOUNTER
Identifying Data:  48 y.o. , Shikha Evans , female     Historical Data Reviewed ; Allergies-  No Known Allergies  Current medication as of last visit and reconciliation-  Current Outpatient Medications on File Prior to Visit   Medication Sig Dispense Refill    warfarin (COUMADIN) 5 mg tablet Take 1 Tab by mouth daily. 30 Tab 5    dextroamphetamine-amphetamine (ADDERALL) 30 mg tablet Take 1 Tab by mouth two (2) times a day. Max Daily Amount: 2 Tabs. New dose and frequency 60 Tab 0    HYDROcodone-acetaminophen (NORCO)  mg tablet Take 1 Tab by mouth every six (6) hours as needed for Pain for up to 30 days. Max Daily Amount: 4 Tabs. 120 Tab 0    warfarin (COUMADIN) 7.5 mg tablet Take 1 Tab by mouth daily for 180 days. 90 Tab 1    metFORMIN (GLUCOPHAGE) 1,000 mg tablet TAKE 1 TABLET BY MOUTH TWICE DAILY BEFORE MEALS 180 Tab 3    [DISCONTINUED] zolpidem (AMBIEN) 10 mg tablet Take 1 Tab by mouth nightly as needed for Sleep. Max Daily Amount: 10 mg. 30 Tab 3    warfarin (COUMADIN) 10 mg tablet TAKE 1 TABLET BY MOUTH EVERY DAY 30 Tab 5    lancets (OneTouch Delica Plus Lancet) 33 gauge misc by Does Not Apply route. No current facility-administered medications on file prior to visit. Past Medical History-  Patient Active Problem List   Diagnosis Code    Pain in both lower extremities M79.604, M79.605    Attention deficit hyperactivity disorder (ADHD), predominantly inattentive type F90.0       Assessment and Plan:    ICD-10-CM ICD-9-CM    1.  Primary insomnia  F51.01 307.42 zolpidem (AMBIEN) 10 mg tablet           Rosalina Paez DO Principal Discharge DX:	Injury of finger of right hand   1 Principal Discharge DX:	Sprain of finger of right hand

## 2021-09-03 ENCOUNTER — OFFICE VISIT (OUTPATIENT)
Dept: BEHAVIORAL/MENTAL HEALTH CLINIC | Age: 51
End: 2021-09-03
Payer: COMMERCIAL

## 2021-09-03 VITALS — WEIGHT: 195.6 LBS | BODY MASS INDEX: 31.1 KG/M2

## 2021-09-03 DIAGNOSIS — F98.8 ATTENTION DEFICIT DISORDER PREDOMINANT INATTENTIVE TYPE: Primary | ICD-10-CM

## 2021-09-03 PROCEDURE — 90792 PSYCH DIAG EVAL W/MED SRVCS: CPT | Performed by: NURSE PRACTITIONER

## 2021-09-03 RX ORDER — BUPROPION HYDROCHLORIDE 150 MG/1
150 TABLET ORAL DAILY
Qty: 30 TABLET | Refills: 1 | Status: SHIPPED | OUTPATIENT
Start: 2021-09-03 | End: 2021-10-05
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
## 2021-09-22 ENCOUNTER — TELEPHONE (OUTPATIENT)
Dept: FAMILY MEDICINE CLINIC | Age: 51
End: 2021-09-22

## 2021-09-22 DIAGNOSIS — E78.2 MIXED HYPERLIPIDEMIA: ICD-10-CM

## 2021-09-22 DIAGNOSIS — E11.9 CONTROLLED TYPE 2 DIABETES MELLITUS WITHOUT COMPLICATION, WITHOUT LONG-TERM CURRENT USE OF INSULIN (HCC): Primary | ICD-10-CM

## 2021-09-22 NOTE — TELEPHONE ENCOUNTER
Pt left message. States that her work is now doing blood work again. Asking if you can order blood work for her \"A1C and other things\". Fax order to [phone].

## 2021-09-24 NOTE — TELEPHONE ENCOUNTER
Identifying Data:  46 y.o. , Zane Conrad , female     HISTORICAL DATA (REVIEWED TODAY):  ALLERGIES-    No Known Allergies    MEDICATION AS OF LAST RECONCILIATION (NOT INCLUDING CHANGES MADE TODAY):    Current Outpatient Medications on File Prior to Visit   Medication Sig Dispense Refill    buPROPion XL (WELLBUTRIN XL) 150 mg tablet Take 1 Tablet by mouth daily for 30 days. 30 Tablet 1    warfarin (COUMADIN) 7.5 mg tablet TAKE 1 TABLET BY MOUTH EVERY DAY AS DIRECTED AS NEEDED 90 Tablet 1    naloxone (Narcan) 4 mg/actuation nasal spray Use 1 spray intranasally, then discard. Repeat with new spray every 2 min as needed for opioid overdose symptoms, alternating nostrils. Indications: decrease in rate & depth of breathing due to opioid drug 2 Each 0    warfarin (COUMADIN) 5 mg tablet Take 1 Tab by mouth daily. 30 Tab 5    metFORMIN (GLUCOPHAGE) 1,000 mg tablet TAKE 1 TABLET BY MOUTH TWICE DAILY BEFORE MEALS 180 Tab 3    lancets (OneTouch Delica Plus Lancet) 33 gauge misc by Does Not Apply route. No current facility-administered medications on file prior to visit.        PAST MEDICAL HISTORY:    Patient Active Problem List   Diagnosis Code    Pain in both lower extremities M79.604, M79.605    Attention deficit hyperactivity disorder (ADHD), predominantly inattentive type F90.0    Pulmonary emboli ([facility]) I26.99    DVT (deep venous thrombosis) ([facility]) I82.409    Low back pain M54.5    Left knee pain M25.562       ASSESSMENT AND PLAN:    Borderline mixed hyperlipidemia and diabetes         Pleasant Pillow, DO

## 2021-10-05 DIAGNOSIS — F98.8 ATTENTION DEFICIT DISORDER PREDOMINANT INATTENTIVE TYPE: ICD-10-CM

## 2021-10-05 RX ORDER — BUPROPION HYDROCHLORIDE 150 MG/1
TABLET ORAL
Qty: 30 TABLET | Refills: 1 | Status: SHIPPED | OUTPATIENT
Start: 2021-10-05 | End: 2022-01-10 | Stop reason: SDUPTHER

## 2021-10-26 LAB
ALBUMIN SERPL-MCNC: 4.2 G/DL (ref 3.8–4.9)
ALBUMIN/GLOB SERPL: 1.4 {RATIO} (ref 1.2–2.2)
ALP SERPL-CCNC: 149 IU/L (ref 44–121)
ALT SERPL-CCNC: 24 IU/L (ref 0–32)
AST SERPL-CCNC: 20 IU/L (ref 0–40)
BASOPHILS # BLD AUTO: 0.1 X10E3/UL (ref 0–0.2)
BASOPHILS NFR BLD AUTO: 1 %
BILIRUB SERPL-MCNC: 0.2 MG/DL (ref 0–1.2)
BUN SERPL-MCNC: 11 MG/DL (ref 6–24)
BUN/CREAT SERPL: 16 (ref 9–23)
CALCIUM SERPL-MCNC: 8.9 MG/DL (ref 8.7–10.2)
CHLORIDE SERPL-SCNC: 99 MMOL/L (ref 96–106)
CHOLEST SERPL-MCNC: 207 MG/DL (ref 100–199)
CO2 SERPL-SCNC: 21 MMOL/L (ref 20–29)
CREAT SERPL-MCNC: 0.7 MG/DL (ref 0.57–1)
EOSINOPHIL # BLD AUTO: 0.2 X10E3/UL (ref 0–0.4)
EOSINOPHIL NFR BLD AUTO: 2 %
ERYTHROCYTE [DISTWIDTH] IN BLOOD BY AUTOMATED COUNT: 16.7 % (ref 11.7–15.4)
EST. AVERAGE GLUCOSE BLD GHB EST-MCNC: 286 MG/DL
GLOBULIN SER CALC-MCNC: 2.9 G/DL (ref 1.5–4.5)
GLUCOSE SERPL-MCNC: 280 MG/DL (ref 65–99)
HBA1C MFR BLD: 11.6 % (ref 4.8–5.6)
HCT VFR BLD AUTO: 42.1 % (ref 34–46.6)
HDLC SERPL-MCNC: 49 MG/DL
HGB BLD-MCNC: 13.3 G/DL (ref 11.1–15.9)
IMM GRANULOCYTES # BLD AUTO: 0 X10E3/UL (ref 0–0.1)
IMM GRANULOCYTES NFR BLD AUTO: 0 %
LDLC SERPL CALC-MCNC: 123 MG/DL (ref 0–99)
LYMPHOCYTES # BLD AUTO: 1.8 X10E3/UL (ref 0.7–3.1)
LYMPHOCYTES NFR BLD AUTO: 20 %
MCH RBC QN AUTO: 25.1 PG (ref 26.6–33)
MCHC RBC AUTO-ENTMCNC: 31.6 G/DL (ref 31.5–35.7)
MCV RBC AUTO: 79 FL (ref 79–97)
MONOCYTES # BLD AUTO: 0.7 X10E3/UL (ref 0.1–0.9)
MONOCYTES NFR BLD AUTO: 7 %
NEUTROPHILS # BLD AUTO: 6.2 X10E3/UL (ref 1.4–7)
NEUTROPHILS NFR BLD AUTO: 70 %
PLATELET # BLD AUTO: 268 X10E3/UL (ref 150–450)
POTASSIUM SERPL-SCNC: 4.5 MMOL/L (ref 3.5–5.2)
PROT SERPL-MCNC: 7.1 G/DL (ref 6–8.5)
RBC # BLD AUTO: 5.3 X10E6/UL (ref 3.77–5.28)
SODIUM SERPL-SCNC: 137 MMOL/L (ref 134–144)
TRIGL SERPL-MCNC: 196 MG/DL (ref 0–149)
TSH SERPL DL<=0.005 MIU/L-ACNC: 1.32 UIU/ML (ref 0.45–4.5)
VLDLC SERPL CALC-MCNC: 35 MG/DL (ref 5–40)
WBC # BLD AUTO: 9 X10E3/UL (ref 3.4–10.8)

## 2021-10-27 DIAGNOSIS — E11.9 CONTROLLED TYPE 2 DIABETES MELLITUS WITHOUT COMPLICATION, WITHOUT LONG-TERM CURRENT USE OF INSULIN (HCC): Primary | ICD-10-CM

## 2021-10-27 NOTE — TELEPHONE ENCOUNTER
The hemoglobin A1c is 1.6. Should be less than 7. I recommend the medicine called Jessie Haider or Timbo Sorto if she is not taking it she will continue the Metformin. Blood count is normal without anemia or infection. Liver and kidneys are normal.  Cholesterol is not too bad continue to watch diet which will also help diabetes.   Thyroid is normal.

## 2021-10-27 NOTE — PROGRESS NOTES
Identifying Data:  46 y.o. , Mihaela Rebolledo , female     HISTORICAL DATA (REVIEWED TODAY):  ALLERGIES-    No Known Allergies    MEDICATION AS OF LAST RECONCILIATION (NOT INCLUDING CHANGES MADE TODAY):    Current Outpatient Medications on File Prior to Visit   Medication Sig Dispense Refill    buPROPion XL (WELLBUTRIN XL) 150 mg tablet TAKE 1 TABLET BY MOUTH DAILY 30 Tablet 1    warfarin (COUMADIN) 7.5 mg tablet TAKE 1 TABLET BY MOUTH EVERY DAY AS DIRECTED AS NEEDED 90 Tablet 1    naloxone (Narcan) 4 mg/actuation nasal spray Use 1 spray intranasally, then discard. Repeat with new spray every 2 min as needed for opioid overdose symptoms, alternating nostrils. Indications: decrease in rate & depth of breathing due to opioid drug 2 Each 0    warfarin (COUMADIN) 5 mg tablet Take 1 Tab by mouth daily. 30 Tab 5    metFORMIN (GLUCOPHAGE) 1,000 mg tablet TAKE 1 TABLET BY MOUTH TWICE DAILY BEFORE MEALS 180 Tab 3    lancets (OneTouch Delica Plus Lancet) 33 gauge misc by Does Not Apply route. No current facility-administered medications on file prior to visit. PAST MEDICAL HISTORY:    Patient Active Problem List   Diagnosis Code    Pain in both lower extremities M79.604, M79.605    Attention deficit hyperactivity disorder (ADHD), predominantly inattentive type F90.0    Pulmonary emboli ([facility]) I26.99    DVT (deep venous thrombosis) ([facility]) I82.409    Low back pain M54.50    Left knee pain M25.562       ASSESSMENT AND PLAN:      ICD-10-CM ICD-9-CM    1.  Controlled type 2 diabetes mellitus without complication, without long-term current use of insulin ([facility])  E11.9 250.00 empagliflozin (Jardiance) 10 mg tablet             Krissy Garibay DO

## 2022-01-10 ENCOUNTER — OFFICE VISIT (OUTPATIENT)
Dept: BEHAVIORAL/MENTAL HEALTH CLINIC | Age: 52
End: 2022-01-10
Payer: COMMERCIAL

## 2022-01-10 VITALS — WEIGHT: 193.2 LBS | BODY MASS INDEX: 30.72 KG/M2

## 2022-01-10 DIAGNOSIS — F98.8 ATTENTION DEFICIT DISORDER PREDOMINANT INATTENTIVE TYPE: ICD-10-CM

## 2022-01-10 PROCEDURE — 99214 OFFICE O/P EST MOD 30 MIN: CPT | Performed by: NURSE PRACTITIONER

## 2022-01-10 RX ORDER — BUPROPION HYDROCHLORIDE 300 MG/1
300 TABLET ORAL DAILY
Qty: 90 TABLET | Refills: 1 | Status: SHIPPED | OUTPATIENT
Start: 2022-01-10 | End: 2022-04-13
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
## 2022-01-18 RX ORDER — WARFARIN 7.5 MG/1
TABLET ORAL
Qty: 90 TABLET | Refills: 1 | Status: SHIPPED | OUTPATIENT
Start: 2022-01-18

## 2022-01-20 NOTE — PROGRESS NOTES
Scanned note from [address] [facility] dated 12-12-21 recommending statin as she has diabetes. Reviewed. Will need to be discussed with patient.     Jamar Bowers, DO

## 2022-02-18 ENCOUNTER — TELEPHONE (OUTPATIENT)
Dept: FAMILY MEDICINE CLINIC | Age: 52
End: 2022-02-18

## 2022-02-18 NOTE — TELEPHONE ENCOUNTER
[facility] sent a message that patient wanted to be seen due to INR. Patient was with Dr. Florencia Ruvalcaba and patient had cancelled an appointment in February but now wants to come see a nurse for INR. Spoke to nurse (ANÍBAL) and nurse stated she would need to be seen by provider to have INR written and checked. Our office has no opening until April 19th and tried to give patient an appointment and even offered other locations to be seen earlier. Patient stated the hell with this and hung up.

## 2022-03-18 PROBLEM — F90.0 ATTENTION DEFICIT HYPERACTIVITY DISORDER (ADHD), PREDOMINANTLY INATTENTIVE TYPE: Status: ACTIVE | Noted: 2020-08-07

## 2022-03-20 PROBLEM — M79.605 PAIN IN BOTH LOWER EXTREMITIES: Status: ACTIVE | Noted: 2020-08-07

## 2022-03-20 PROBLEM — M79.604 PAIN IN BOTH LOWER EXTREMITIES: Status: ACTIVE | Noted: 2020-08-07

## 2022-04-13 DIAGNOSIS — F98.8 ATTENTION DEFICIT DISORDER PREDOMINANT INATTENTIVE TYPE: ICD-10-CM

## 2022-04-13 RX ORDER — BUPROPION HYDROCHLORIDE 300 MG/1
TABLET ORAL
Qty: 90 TABLET | Refills: 1 | Status: SHIPPED | OUTPATIENT
Start: 2022-04-13 | End: 2022-05-09 | Stop reason: SDUPTHER

## 2022-04-19 ENCOUNTER — CLINICAL SUPPORT (OUTPATIENT)
Dept: FAMILY MEDICINE CLINIC | Age: 52
End: 2022-04-19

## 2022-04-19 DIAGNOSIS — I26.99 PULMONARY EMBOLISM, UNSPECIFIED CHRONICITY, UNSPECIFIED PULMONARY EMBOLISM TYPE, UNSPECIFIED WHETHER ACUTE COR PULMONALE PRESENT (HCC): ICD-10-CM

## 2022-04-19 DIAGNOSIS — I26.99 PULMONARY EMBOLISM, UNSPECIFIED CHRONICITY, UNSPECIFIED PULMONARY EMBOLISM TYPE, UNSPECIFIED WHETHER ACUTE COR PULMONALE PRESENT (HCC): Primary | ICD-10-CM

## 2022-04-19 NOTE — PROGRESS NOTES
Chief Complaint   Patient presents with    Coagulation disorder     check INR     PT-24.4  INR-2.0  Patient taking 5mg daily with 7.5 mg on Tuesdays and Saturdays. Spoke to [address] [place] [address] about results and patient to continue same dosage and recheck in 1 month.

## 2022-04-25 RX ORDER — WARFARIN SODIUM 5 MG/1
5 TABLET ORAL DAILY
Qty: 30 TABLET | Refills: 5 | Status: SHIPPED | OUTPATIENT
Start: 2022-04-25

## 2022-05-09 ENCOUNTER — OFFICE VISIT (OUTPATIENT)
Dept: BEHAVIORAL/MENTAL HEALTH CLINIC | Age: 52
End: 2022-05-09
Payer: COMMERCIAL

## 2022-05-09 VITALS — WEIGHT: 184.4 LBS | BODY MASS INDEX: 29.32 KG/M2

## 2022-05-09 DIAGNOSIS — F98.8 ATTENTION DEFICIT DISORDER PREDOMINANT INATTENTIVE TYPE: ICD-10-CM

## 2022-05-09 PROCEDURE — 99213 OFFICE O/P EST LOW 20 MIN: CPT | Performed by: NURSE PRACTITIONER

## 2022-05-09 RX ORDER — BUPROPION HYDROCHLORIDE 300 MG/1
300 TABLET ORAL DAILY
Qty: 90 TABLET | Refills: 1 | Status: SHIPPED | OUTPATIENT
Start: 2022-05-09 | End: 2022-08-07
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
## 2022-05-16 ENCOUNTER — OFFICE VISIT (OUTPATIENT)
Dept: PRIMARY CARE CLINIC | Age: 52
End: 2022-05-16
Payer: COMMERCIAL

## 2022-05-16 VITALS
HEART RATE: 84 BPM | RESPIRATION RATE: 18 BRPM | BODY MASS INDEX: 30.25 KG/M2 | OXYGEN SATURATION: 98 % | SYSTOLIC BLOOD PRESSURE: 113 MMHG | WEIGHT: 188.2 LBS | TEMPERATURE: 97.2 F | HEIGHT: 66 IN | DIASTOLIC BLOOD PRESSURE: 70 MMHG

## 2022-05-16 DIAGNOSIS — Z86.718 HISTORY OF DVT (DEEP VEIN THROMBOSIS): ICD-10-CM

## 2022-05-16 DIAGNOSIS — Z79.01 CURRENT LONG-TERM USE OF ANTICOAGULANT MEDICATION WITH HISTORY OF DEEP VENOUS THROMBOSIS (DVT): Primary | ICD-10-CM

## 2022-05-16 DIAGNOSIS — E11.65 UNCONTROLLED TYPE 2 DIABETES MELLITUS WITH HYPERGLYCEMIA (HCC): ICD-10-CM

## 2022-05-16 DIAGNOSIS — Z86.718 CURRENT LONG-TERM USE OF ANTICOAGULANT MEDICATION WITH HISTORY OF DEEP VENOUS THROMBOSIS (DVT): Primary | ICD-10-CM

## 2022-05-16 DIAGNOSIS — E55.9 VITAMIN D DEFICIENCY: ICD-10-CM

## 2022-05-16 DIAGNOSIS — F90.0 ATTENTION DEFICIT HYPERACTIVITY DISORDER (ADHD), PREDOMINANTLY INATTENTIVE TYPE: ICD-10-CM

## 2022-05-16 LAB
HBA1C MFR BLD HPLC: 14 %
INR BLD: 1.7
PT POC: 20.4 SECONDS
VALID INTERNAL CONTROL?: YES

## 2022-05-16 PROCEDURE — 85610 PROTHROMBIN TIME: CPT | Performed by: NURSE PRACTITIONER

## 2022-05-16 PROCEDURE — 99214 OFFICE O/P EST MOD 30 MIN: CPT | Performed by: NURSE PRACTITIONER

## 2022-05-16 PROCEDURE — 3046F HEMOGLOBIN A1C LEVEL >9.0%: CPT | Performed by: NURSE PRACTITIONER

## 2022-05-16 PROCEDURE — 83036 HEMOGLOBIN GLYCOSYLATED A1C: CPT | Performed by: NURSE PRACTITIONER
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
## 2022-05-16 NOTE — PROGRESS NOTES
Chief Complaint   Patient presents with    New Patient    Establish Care     1. \"Have you been to the ER, urgent care clinic since your last visit? Hospitalized since your last visit? \" No    2. \"Have you seen or consulted any other health care providers outside of the [address] [place] [address] since your last visit? \" No     3. For patients aged 39-70: Has the patient had a colonoscopy / FIT/ Cologuard? No      If the patient is female:    4. For patients aged 41-77: Has the patient had a mammogram within the past 2 years? Yes - no Care Gap present      5. For patients aged 21-65: Has the patient had a pap smear? Yes - Care Gap present.  Rooming MA/LPN to request most recent results physicians for women in [address]

## 2022-05-20 PROBLEM — Z91.199 NONCOMPLIANCE WITH DIABETES TREATMENT: Status: ACTIVE | Noted: 2022-05-20

## 2022-05-20 PROBLEM — M79.605 PAIN IN BOTH LOWER EXTREMITIES: Status: RESOLVED | Noted: 2020-08-07 | Resolved: 2022-05-20

## 2022-05-20 PROBLEM — M79.604 PAIN IN BOTH LOWER EXTREMITIES: Status: RESOLVED | Noted: 2020-08-07 | Resolved: 2022-05-20

## 2022-05-20 LAB
25(OH)D3+25(OH)D2 SERPL-MCNC: 13.8 NG/ML (ref 30–100)
ALBUMIN SERPL-MCNC: 3.7 G/DL (ref 3.8–4.9)
ALBUMIN/CREAT UR: 119 MG/G CREAT (ref 0–29)
ALBUMIN/GLOB SERPL: 1.3 {RATIO} (ref 1.2–2.2)
ALP SERPL-CCNC: 150 IU/L (ref 44–121)
ALT SERPL-CCNC: 21 IU/L (ref 0–32)
APPEARANCE UR: ABNORMAL
AST SERPL-CCNC: 19 IU/L (ref 0–40)
BACTERIA #/AREA URNS HPF: ABNORMAL /[HPF]
BASOPHILS # BLD AUTO: 0 X10E3/UL (ref 0–0.2)
BASOPHILS NFR BLD AUTO: 1 %
BILIRUB SERPL-MCNC: 0.2 MG/DL (ref 0–1.2)
BILIRUB UR QL STRIP: NEGATIVE
BUN SERPL-MCNC: 11 MG/DL (ref 6–24)
BUN/CREAT SERPL: 18 (ref 9–23)
CALCIUM SERPL-MCNC: 8.7 MG/DL (ref 8.7–10.2)
CASTS URNS QL MICRO: ABNORMAL /LPF
CHLORIDE SERPL-SCNC: 100 MMOL/L (ref 96–106)
CO2 SERPL-SCNC: 20 MMOL/L (ref 20–29)
COLOR UR: YELLOW
CREAT SERPL-MCNC: 0.61 MG/DL (ref 0.57–1)
CREAT UR-MCNC: 30.4 MG/DL
EGFR: 108 ML/MIN/1.73
EOSINOPHIL # BLD AUTO: 0.1 X10E3/UL (ref 0–0.4)
EOSINOPHIL NFR BLD AUTO: 1 %
EPI CELLS #/AREA URNS HPF: ABNORMAL /HPF (ref 0–10)
ERYTHROCYTE [DISTWIDTH] IN BLOOD BY AUTOMATED COUNT: 14 % (ref 11.7–15.4)
EST. AVERAGE GLUCOSE BLD GHB EST-MCNC: 364 MG/DL
GLOBULIN SER CALC-MCNC: 2.8 G/DL (ref 1.5–4.5)
GLUCOSE SERPL-MCNC: 341 MG/DL (ref 65–99)
GLUCOSE UR QL STRIP: ABNORMAL
HBA1C MFR BLD: 14.3 % (ref 4.8–5.6)
HCT VFR BLD AUTO: 41.6 % (ref 34–46.6)
HGB BLD-MCNC: 13.3 G/DL (ref 11.1–15.9)
HGB UR QL STRIP: NEGATIVE
IMM GRANULOCYTES # BLD AUTO: 0 X10E3/UL (ref 0–0.1)
IMM GRANULOCYTES NFR BLD AUTO: 0 %
KETONES UR QL STRIP: NEGATIVE
LEUKOCYTE ESTERASE UR QL STRIP: ABNORMAL
LYMPHOCYTES # BLD AUTO: 1.4 X10E3/UL (ref 0.7–3.1)
LYMPHOCYTES NFR BLD AUTO: 23 %
MCH RBC QN AUTO: 26.5 PG (ref 26.6–33)
MCHC RBC AUTO-ENTMCNC: 32 G/DL (ref 31.5–35.7)
MCV RBC AUTO: 83 FL (ref 79–97)
MICRO URNS: ABNORMAL
MICROALBUMIN UR-MCNC: 36.1 UG/ML
MONOCYTES # BLD AUTO: 0.4 X10E3/UL (ref 0.1–0.9)
MONOCYTES NFR BLD AUTO: 7 %
NEUTROPHILS # BLD AUTO: 4.3 X10E3/UL (ref 1.4–7)
NEUTROPHILS NFR BLD AUTO: 68 %
NITRITE UR QL STRIP: NEGATIVE
PH UR STRIP: 5.5 [PH] (ref 5–7.5)
PLATELET # BLD AUTO: 200 X10E3/UL (ref 150–450)
POTASSIUM SERPL-SCNC: 4.4 MMOL/L (ref 3.5–5.2)
PROT SERPL-MCNC: 6.5 G/DL (ref 6–8.5)
PROT UR QL STRIP: NEGATIVE
RBC # BLD AUTO: 5.01 X10E6/UL (ref 3.77–5.28)
RBC #/AREA URNS HPF: ABNORMAL /HPF (ref 0–2)
SODIUM SERPL-SCNC: 137 MMOL/L (ref 134–144)
SP GR UR STRIP: 1.03 (ref 1–1.03)
T4 FREE SERPL-MCNC: 1.37 NG/DL (ref 0.82–1.77)
TSH SERPL DL<=0.005 MIU/L-ACNC: 0.25 UIU/ML (ref 0.45–4.5)
UROBILINOGEN UR STRIP-MCNC: 0.2 MG/DL (ref 0.2–1)
WBC # BLD AUTO: 6.3 X10E3/UL (ref 3.4–10.8)
WBC #/AREA URNS HPF: >30 /HPF (ref 0–5)

## 2022-05-20 RX ORDER — INSULIN PUMP SYRINGE, 3 ML
EACH MISCELLANEOUS
Qty: 1 KIT | Refills: 0 | Status: SHIPPED | OUTPATIENT
Start: 2022-05-20

## 2022-05-20 RX ORDER — LANCETS
EACH MISCELLANEOUS
Qty: 100 EACH | Refills: 11 | Status: SHIPPED | OUTPATIENT
Start: 2022-05-20

## 2022-05-20 RX ORDER — PEN NEEDLE, DIABETIC 30 GX3/16"
NEEDLE, DISPOSABLE MISCELLANEOUS
Qty: 1 EACH | Refills: 11 | Status: SHIPPED | OUTPATIENT
Start: 2022-05-20

## 2022-05-27 ENCOUNTER — TELEPHONE (OUTPATIENT)
Dept: PRIMARY CARE CLINIC | Age: 52
End: 2022-05-27

## 2022-05-27 NOTE — TELEPHONE ENCOUNTER
You are welcome to send her the notes.  My concern is ensuring that patient is going to come frequently during that time for the INR checks

## 2022-05-27 NOTE — TELEPHONE ENCOUNTER
I am not sure what surgery she is having so first I will need more details on that as she was new patient.  Also A1c was 14.0 and had not been non medication so feel this needs to be addressed first.

## 2022-05-27 NOTE — TELEPHONE ENCOUNTER
Pt having surgery on June 16th. Janette called from [facility] surgery and wants to know what to do about the coumadin therapy before and after surgery for the pt?  They need orders verbal or written  [phone]

## 2022-06-01 ENCOUNTER — TELEPHONE (OUTPATIENT)
Dept: PRIMARY CARE CLINIC | Age: 52
End: 2022-06-01

## 2022-06-01 NOTE — TELEPHONE ENCOUNTER
I have left a message and let the  know to refer call to you. I have sent message to patient that she will need to stop coumadin 5 days before and INR needs to be checked prior to surgery and be less than 1.5. Will need notification from the surgeon on bleeding risk with this procedure as she will need to wait to resume the coumadin and lovenox 48-72 hours after if increased risk for bleeding postop (pending hemostasis of surgical site,etc). Also the patient did not do a preop here so they will need to let us know if anemia is present. The patient told me that lovenox was sent by her surgeon for after the procedure and this needs to be clarified on whether I will need to send or not. Patient already advised that she needs to let me know when she is discharged after so we can set up time to get INR done as we will be trying to get her back therapeutic.

## 2022-06-01 NOTE — TELEPHONE ENCOUNTER
I called and left voice mail stating word for word the message that LEANDER Haddad NP sent me on Yasmani Webb' voice mail (Surgical Coordinator). I also stated if any questions or concerns to let me know by calling our office at [phone]. No > 17 inches

## 2022-06-06 RX ORDER — ERGOCALCIFEROL 1.25 MG/1
50000 CAPSULE ORAL
Qty: 8 CAPSULE | Refills: 0 | Status: SHIPPED | OUTPATIENT
Start: 2022-06-06 | End: 2022-06-09

## 2022-06-09 DIAGNOSIS — E55.9 VITAMIN D DEFICIENCY: ICD-10-CM

## 2022-06-09 RX ORDER — ERGOCALCIFEROL 1.25 MG/1
CAPSULE ORAL
Qty: 12 CAPSULE | Refills: 0 | Status: SHIPPED | OUTPATIENT
Start: 2022-06-09

## 2022-06-10 ENCOUNTER — OFFICE VISIT (OUTPATIENT)
Dept: PRIMARY CARE CLINIC | Age: 52
End: 2022-06-10
Payer: COMMERCIAL

## 2022-06-10 VITALS
DIASTOLIC BLOOD PRESSURE: 72 MMHG | HEIGHT: 66 IN | TEMPERATURE: 97.5 F | BODY MASS INDEX: 30.86 KG/M2 | HEART RATE: 94 BPM | WEIGHT: 192 LBS | SYSTOLIC BLOOD PRESSURE: 112 MMHG | RESPIRATION RATE: 18 BRPM | OXYGEN SATURATION: 98 %

## 2022-06-10 DIAGNOSIS — Z86.718 HISTORY OF DVT (DEEP VEIN THROMBOSIS): ICD-10-CM

## 2022-06-10 DIAGNOSIS — E11.65 UNCONTROLLED TYPE 2 DIABETES MELLITUS WITH HYPERGLYCEMIA (HCC): ICD-10-CM

## 2022-06-10 DIAGNOSIS — Z86.718 CURRENT LONG-TERM USE OF ANTICOAGULANT MEDICATION WITH HISTORY OF DEEP VENOUS THROMBOSIS (DVT): Primary | ICD-10-CM

## 2022-06-10 DIAGNOSIS — Z79.01 CURRENT LONG-TERM USE OF ANTICOAGULANT MEDICATION WITH HISTORY OF DEEP VENOUS THROMBOSIS (DVT): Primary | ICD-10-CM

## 2022-06-10 PROBLEM — Z79.4 CONTROLLED TYPE 2 DIABETES MELLITUS WITH HYPERGLYCEMIA, WITH LONG-TERM CURRENT USE OF INSULIN (HCC): Status: ACTIVE | Noted: 2022-06-10

## 2022-06-10 LAB
INR BLD: 1.1
PT POC: 15.4 SECONDS
VALID INTERNAL CONTROL?: YES

## 2022-06-10 PROCEDURE — 3046F HEMOGLOBIN A1C LEVEL >9.0%: CPT | Performed by: NURSE PRACTITIONER

## 2022-06-10 PROCEDURE — 85610 PROTHROMBIN TIME: CPT | Performed by: NURSE PRACTITIONER

## 2022-06-10 PROCEDURE — 99214 OFFICE O/P EST MOD 30 MIN: CPT | Performed by: NURSE PRACTITIONER

## 2022-06-10 RX ORDER — INSULIN GLARGINE-YFGN 100 [IU]/ML
15 INJECTION, SOLUTION SUBCUTANEOUS DAILY
COMMUNITY
Start: 2022-06-02

## 2022-06-10 RX ORDER — HYDROCORTISONE 25 MG/G
CREAM TOPICAL
COMMUNITY
Start: 2022-05-31

## 2022-06-10 RX ORDER — METFORMIN HYDROCHLORIDE 1000 MG/1
TABLET ORAL 2 TIMES DAILY
COMMUNITY
Start: 2022-05-31

## 2022-06-10 NOTE — PROGRESS NOTES
Chief Complaint   Patient presents with    Diabetes     Follow up       1. Have you been to the ER, urgent care clinic since your last visit? Hospitalized since your last visit? No    2. Have you seen or consulted any other health care providers outside of the [address] [place] [address] since your last visit? Include any pap smears or colon screening.  No
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
## 2022-06-21 LAB
INR PPP: 1.3 (ref 0.9–1.2)
PROTHROMBIN TIME: 13.4 SEC (ref 9.1–12)

## 2022-06-21 NOTE — PROGRESS NOTES
LVM stating to take Warfarin 7.5 mg MON's, Tues's, Wed's, Thurs's, and Fri's. . Take Warfarin 5 mg on Sat's, and Sun's. Repeat INR in 2 weeks at [facility].  If any questions or concerns, patient is to call our office. This was repeated x2.

## 2022-06-21 NOTE — PROGRESS NOTES
She remains untherapeutic. Please find out if she has missed doses or we are going to need to increase.

## 2022-07-15 LAB
INR PPP: 1.2 (ref 0.9–1.2)
PROTHROMBIN TIME: 12.6 SEC (ref 9.1–12)

## 2022-07-17 NOTE — PROGRESS NOTES
Please let patient know that PT/INR continues out of therapuetic range. Confirm if any missed doses (reported previously) prior to adjustment. Still want her to highly consider switch to eliquis or xarelto for better protection.

## 2022-07-20 ENCOUNTER — TELEPHONE (OUTPATIENT)
Dept: PRIMARY CARE CLINIC | Age: 52
End: 2022-07-20

## 2022-07-20 DIAGNOSIS — I82.90 DEEP VEIN THROMBOSIS (DVT) OF NON-EXTREMITY VEIN, UNSPECIFIED CHRONICITY: Primary | ICD-10-CM

## 2022-07-20 NOTE — PROGRESS NOTES
Informed patient of PT/INR results. She stated she has not missed any doses of her Warfarin. She is taking 7.5 mg Monday thru Thursday and 5 mg Friday thru Sunday. She asked if some of her diabetic medications could have something to do with it. I informed her of your recommendations about Eliquis and Xarelto.

## 2022-07-21 NOTE — PROGRESS NOTES
Informed patient to increase Warfarin to 7.5 mg every day and need for repeat of INR in 1 week. Patient stated understanding.

## 2022-08-06 LAB
INR PPP: 1.3 (ref 0.9–1.2)
PROTHROMBIN TIME: 13.5 SEC (ref 9.1–12)

## 2022-08-09 ENCOUNTER — OFFICE VISIT (OUTPATIENT)
Dept: BEHAVIORAL/MENTAL HEALTH CLINIC | Age: 52
End: 2022-08-09
Payer: COMMERCIAL

## 2022-08-09 VITALS — WEIGHT: 185.2 LBS | BODY MASS INDEX: 29.89 KG/M2

## 2022-08-09 DIAGNOSIS — R41.840 ATTENTION DEFICIT: Primary | ICD-10-CM

## 2022-08-09 PROCEDURE — 99213 OFFICE O/P EST LOW 20 MIN: CPT | Performed by: NURSE PRACTITIONER

## 2022-08-09 RX ORDER — BUPROPION HYDROCHLORIDE 300 MG/1
300 TABLET ORAL DAILY
Qty: 90 TABLET | Refills: 3 | Status: SHIPPED | OUTPATIENT
Start: 2022-08-09 | End: 2022-11-07
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
## 2022-08-14 DIAGNOSIS — Z86.718 HISTORY OF DVT (DEEP VEIN THROMBOSIS): Primary | ICD-10-CM

## 2022-08-14 NOTE — PROGRESS NOTES
Good afternoon,  Sorry for delay in follow up on INR as I was out of office last week. This is remaining low. If you will confirm no missing doses as would expect this to have improved some given we increased you to 7.5mg daily. Still want you to consider xarleto or eliquis options to provide better protection but if not then will increase coumadin dose. Will discuss increase after I hear back.      Thank Ryan Sorto

## 2022-08-16 NOTE — PROGRESS NOTES
Patient stated she did not increase her Warfarin until 08/06/2022. She started the 7.5 mg on that day. So she hopes she can have her PT/INR done again maybe today and if there is no change she will do whatever you recommend. But, since surgery is so close, it may good to wait to change medications. She stated to just send her a message thru the portal because she is at [address] [place] [address]  with her spouse at this time.

## 2022-08-21 DIAGNOSIS — E11.65 UNCONTROLLED TYPE 2 DIABETES MELLITUS WITH HYPERGLYCEMIA (HCC): ICD-10-CM

## 2022-08-21 RX ORDER — EMPAGLIFLOZIN 25 MG/1
TABLET, FILM COATED ORAL
Qty: 90 TABLET | Refills: 1 | Status: SHIPPED | OUTPATIENT
Start: 2022-08-21

## 2022-08-25 LAB
INR PPP: 1.4 (ref 0.9–1.2)
PROTHROMBIN TIME: 14.4 SEC (ref 9.1–12)

## 2022-08-31 DIAGNOSIS — Z86.718 HISTORY OF DVT (DEEP VEIN THROMBOSIS): Primary | ICD-10-CM

## 2022-09-09 ENCOUNTER — TELEPHONE (OUTPATIENT)
Dept: PRIMARY CARE CLINIC | Age: 52
End: 2022-09-09

## 2022-09-09 ENCOUNTER — PATIENT MESSAGE (OUTPATIENT)
Dept: PRIMARY CARE CLINIC | Age: 52
End: 2022-09-09

## 2022-09-09 DIAGNOSIS — Z86.718 HISTORY OF DVT (DEEP VEIN THROMBOSIS): Primary | ICD-10-CM

## 2022-09-09 NOTE — TELEPHONE ENCOUNTER
LVM that I was returning patient's phone call and at her convenience, she could call our office back.

## 2022-12-06 DIAGNOSIS — Z86.718 HISTORY OF DVT (DEEP VEIN THROMBOSIS): ICD-10-CM

## 2022-12-06 RX ORDER — RIVAROXABAN 20 MG/1
TABLET, FILM COATED ORAL
Qty: 90 TABLET | Refills: 0 | Status: SHIPPED | OUTPATIENT
Start: 2022-12-06

## 2023-03-02 ENCOUNTER — OFFICE VISIT (OUTPATIENT)
Dept: PRIMARY CARE CLINIC | Age: 53
End: 2023-03-02
Payer: COMMERCIAL

## 2023-03-02 VITALS
WEIGHT: 177.2 LBS | HEIGHT: 66 IN | TEMPERATURE: 97.5 F | HEART RATE: 73 BPM | BODY MASS INDEX: 28.48 KG/M2 | OXYGEN SATURATION: 98 % | DIASTOLIC BLOOD PRESSURE: 70 MMHG | RESPIRATION RATE: 18 BRPM | SYSTOLIC BLOOD PRESSURE: 113 MMHG

## 2023-03-02 DIAGNOSIS — Z12.11 SCREENING FOR MALIGNANT NEOPLASM OF COLON: ICD-10-CM

## 2023-03-02 DIAGNOSIS — Z86.718 HISTORY OF DVT (DEEP VEIN THROMBOSIS): ICD-10-CM

## 2023-03-02 DIAGNOSIS — E11.65 UNCONTROLLED TYPE 2 DIABETES MELLITUS WITH HYPERGLYCEMIA (HCC): Primary | ICD-10-CM

## 2023-03-02 DIAGNOSIS — E55.9 VITAMIN D DEFICIENCY: ICD-10-CM

## 2023-03-02 DIAGNOSIS — F32.A DEPRESSION, UNSPECIFIED DEPRESSION TYPE: ICD-10-CM

## 2023-03-02 LAB — HBA1C MFR BLD HPLC: 6.3 %

## 2023-03-02 PROCEDURE — 83036 HEMOGLOBIN GLYCOSYLATED A1C: CPT | Performed by: NURSE PRACTITIONER

## 2023-03-02 PROCEDURE — 99214 OFFICE O/P EST MOD 30 MIN: CPT | Performed by: NURSE PRACTITIONER

## 2023-03-02 PROCEDURE — 3044F HG A1C LEVEL LT 7.0%: CPT | Performed by: NURSE PRACTITIONER

## 2023-03-02 RX ORDER — BUPROPION HYDROCHLORIDE 300 MG/1
TABLET ORAL
COMMUNITY
Start: 2023-02-04

## 2023-03-02 RX ORDER — FLUOROURACIL 50 MG/G
CREAM TOPICAL
COMMUNITY
Start: 2022-12-12

## 2023-03-02 RX ORDER — METFORMIN HYDROCHLORIDE 500 MG/1
TABLET ORAL
COMMUNITY
Start: 2023-01-18 | End: 2023-03-02 | Stop reason: ALTCHOICE

## 2023-03-02 RX ORDER — METFORMIN HYDROCHLORIDE 1000 MG/1
1000 TABLET ORAL 2 TIMES DAILY WITH MEALS
COMMUNITY

## 2023-03-02 NOTE — PROGRESS NOTES
Chief Complaint   Patient presents with    Diabetes     Follow up       1. \"Have you been to the ER, urgent care clinic since your last visit? Hospitalized since your last visit? \" No    2. \"Have you seen or consulted any other health care providers outside of the [address] [place] [address] since your last visit? \" No     3. For patients aged 39-70: Has the patient had a colonoscopy / FIT/ Cologuard? No      If the patient is female:    4. For patients aged 41-77: Has the patient had a mammogram within the past 2 years? Yes - no Care Gap present      5. For patients aged 21-65: Has the patient had a pap smear?  Yes [place] [facility]
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
## 2023-03-06 DIAGNOSIS — Z86.718 HISTORY OF DVT (DEEP VEIN THROMBOSIS): ICD-10-CM

## 2023-03-06 RX ORDER — RIVAROXABAN 20 MG/1
TABLET, FILM COATED ORAL
Qty: 90 TABLET | Refills: 0 | Status: SHIPPED | OUTPATIENT
Start: 2023-03-06

## 2023-05-23 RX ORDER — ERGOCALCIFEROL 1.25 MG/1
CAPSULE ORAL
COMMUNITY
Start: 2022-06-09

## 2023-05-23 RX ORDER — LANCETS 30 GAUGE
EACH MISCELLANEOUS
COMMUNITY
Start: 2022-05-20

## 2023-05-23 RX ORDER — FLUOROURACIL 50 MG/G
CREAM TOPICAL
COMMUNITY
Start: 2022-12-12

## 2023-05-23 RX ORDER — BUPROPION HYDROCHLORIDE 300 MG/1
TABLET ORAL
COMMUNITY
Start: 2023-02-04

## 2023-06-04 RX ORDER — RIVAROXABAN 20 MG/1
TABLET, FILM COATED ORAL
Qty: 90 TABLET | Refills: 1 | Status: SHIPPED | OUTPATIENT
Start: 2023-06-04

## 2023-08-23 RX ORDER — SODIUM CHLORIDE 9 MG/ML
INJECTION, SOLUTION INTRAVENOUS CONTINUOUS
Status: CANCELLED | OUTPATIENT
Start: 2023-08-23

## 2023-08-23 RX ORDER — SODIUM CHLORIDE, SODIUM LACTATE, POTASSIUM CHLORIDE, CALCIUM CHLORIDE 600; 310; 30; 20 MG/100ML; MG/100ML; MG/100ML; MG/100ML
INJECTION, SOLUTION INTRAVENOUS CONTINUOUS
Status: CANCELLED | OUTPATIENT
Start: 2023-08-23

## 2023-08-23 NOTE — PERIOP NOTE
Reviewed pre procedure instructions to include an arrival time of 0800, verbalizes understanding. Discharge instructions maybe reviewed with wife Fariba Vail at [phone].

## 2023-08-25 ENCOUNTER — ANESTHESIA (OUTPATIENT)
Facility: HOSPITAL | Age: 53
End: 2023-08-25
Payer: COMMERCIAL

## 2023-08-25 ENCOUNTER — HOSPITAL ENCOUNTER (OUTPATIENT)
Facility: HOSPITAL | Age: 53
Setting detail: OUTPATIENT SURGERY
Discharge: HOME | End: 2023-08-25
Attending: INTERNAL MEDICINE | Admitting: INTERNAL MEDICINE
Payer: COMMERCIAL

## 2023-08-25 ENCOUNTER — ANESTHESIA EVENT (OUTPATIENT)
Facility: HOSPITAL | Age: 53
End: 2023-08-25
Payer: COMMERCIAL

## 2023-08-25 VITALS
HEIGHT: 66 IN | OXYGEN SATURATION: 98 % | SYSTOLIC BLOOD PRESSURE: 128 MMHG | RESPIRATION RATE: 16 BRPM | TEMPERATURE: 97.5 F | BODY MASS INDEX: 27.32 KG/M2 | WEIGHT: 170 LBS | HEART RATE: 84 BPM | DIASTOLIC BLOOD PRESSURE: 84 MMHG

## 2023-08-25 LAB
GLUCOSE BLD STRIP.AUTO-MCNC: 125 MG/DL (ref 65–100)
PERFORMED BY:: ABNORMAL

## 2023-08-25 PROCEDURE — 2500000003 HC RX 250 WO HCPCS: Performed by: NURSE ANESTHETIST, CERTIFIED REGISTERED

## 2023-08-25 PROCEDURE — 3600007502: Performed by: INTERNAL MEDICINE

## 2023-08-25 PROCEDURE — 3600007512: Performed by: INTERNAL MEDICINE

## 2023-08-25 PROCEDURE — 6360000002 HC RX W HCPCS: Performed by: NURSE ANESTHETIST, CERTIFIED REGISTERED

## 2023-08-25 PROCEDURE — 3700000000 HC ANESTHESIA ATTENDED CARE: Performed by: INTERNAL MEDICINE

## 2023-08-25 PROCEDURE — 7100000010 HC PHASE II RECOVERY - FIRST 15 MIN: Performed by: INTERNAL MEDICINE

## 2023-08-25 PROCEDURE — 82962 GLUCOSE BLOOD TEST: CPT

## 2023-08-25 PROCEDURE — 3700000001 HC ADD 15 MINUTES (ANESTHESIA): Performed by: INTERNAL MEDICINE

## 2023-08-25 PROCEDURE — 2709999900 HC NON-CHARGEABLE SUPPLY: Performed by: INTERNAL MEDICINE

## 2023-08-25 PROCEDURE — 2580000003 HC RX 258: Performed by: INTERNAL MEDICINE

## 2023-08-25 PROCEDURE — 2580000003 HC RX 258: Performed by: NURSE ANESTHETIST, CERTIFIED REGISTERED

## 2023-08-25 PROCEDURE — 7100000011 HC PHASE II RECOVERY - ADDTL 15 MIN: Performed by: INTERNAL MEDICINE

## 2023-08-25 RX ORDER — SODIUM CHLORIDE 9 MG/ML
INJECTION, SOLUTION INTRAVENOUS CONTINUOUS
Status: DISCONTINUED | OUTPATIENT
Start: 2023-08-25 | End: 2023-08-25 | Stop reason: HOSPADM

## 2023-08-25 RX ORDER — SODIUM CHLORIDE, SODIUM LACTATE, POTASSIUM CHLORIDE, CALCIUM CHLORIDE 600; 310; 30; 20 MG/100ML; MG/100ML; MG/100ML; MG/100ML
INJECTION, SOLUTION INTRAVENOUS CONTINUOUS
Status: DISCONTINUED | OUTPATIENT
Start: 2023-08-25 | End: 2023-08-25 | Stop reason: HOSPADM

## 2023-08-25 RX ORDER — SODIUM CHLORIDE, SODIUM LACTATE, POTASSIUM CHLORIDE, CALCIUM CHLORIDE 600; 310; 30; 20 MG/100ML; MG/100ML; MG/100ML; MG/100ML
INJECTION, SOLUTION INTRAVENOUS CONTINUOUS PRN
Status: DISCONTINUED | OUTPATIENT
Start: 2023-08-25 | End: 2023-08-25 | Stop reason: SDUPTHER

## 2023-08-25 RX ORDER — LIDOCAINE HYDROCHLORIDE 20 MG/ML
INJECTION, SOLUTION EPIDURAL; INFILTRATION; INTRACAUDAL; PERINEURAL PRN
Status: DISCONTINUED | OUTPATIENT
Start: 2023-08-25 | End: 2023-08-25 | Stop reason: SDUPTHER

## 2023-08-25 RX ORDER — GLYCOPYRROLATE 0.2 MG/ML
INJECTION INTRAMUSCULAR; INTRAVENOUS PRN
Status: DISCONTINUED | OUTPATIENT
Start: 2023-08-25 | End: 2023-08-25 | Stop reason: SDUPTHER

## 2023-08-25 RX ADMIN — GLYCOPYRROLATE 0.2 MG: 0.2 INJECTION, SOLUTION INTRAMUSCULAR; INTRAVENOUS at 09:29

## 2023-08-25 RX ADMIN — SODIUM CHLORIDE, POTASSIUM CHLORIDE, SODIUM LACTATE AND CALCIUM CHLORIDE: 600; 310; 30; 20 INJECTION, SOLUTION INTRAVENOUS at 09:11

## 2023-08-25 RX ADMIN — LIDOCAINE HYDROCHLORIDE 80 MG: 20 INJECTION, SOLUTION EPIDURAL; INFILTRATION; INTRACAUDAL; PERINEURAL at 09:29

## 2023-08-25 RX ADMIN — PROPOFOL 50 MG: 10 INJECTION, EMULSION INTRAVENOUS at 09:33

## 2023-08-25 RX ADMIN — SODIUM CHLORIDE, POTASSIUM CHLORIDE, SODIUM LACTATE AND CALCIUM CHLORIDE: 600; 310; 30; 20 INJECTION, SOLUTION INTRAVENOUS at 09:18

## 2023-08-25 RX ADMIN — PROPOFOL 100 MG: 10 INJECTION, EMULSION INTRAVENOUS at 09:29

## 2023-08-25 RX ADMIN — PROPOFOL 50 MG: 10 INJECTION, EMULSION INTRAVENOUS at 09:35

## 2023-08-25 ASSESSMENT — PAIN SCALES - GENERAL
PAINLEVEL_OUTOF10: 0
PAINLEVEL_OUTOF10: 0

## 2023-08-25 ASSESSMENT — PAIN - FUNCTIONAL ASSESSMENT: PAIN_FUNCTIONAL_ASSESSMENT: 0-10

## 2023-08-25 NOTE — ANESTHESIA POSTPROCEDURE EVALUATION
Department of Anesthesiology  Postprocedure Note    Patient: Master Mata  MRN: [id number]  YOB: 1970  Date of evaluation: 8/25/2023      Procedure Summary     Date: 08/25/23 Room / Location: [facility] ENDO 03 / [facility]COPY    Anesthesia Start: 0919 Anesthesia Stop: 4008    Procedure: COLONOSCOPY (Lower GI Region) Diagnosis:       Colon cancer screening      (Colon cancer screening [Z12.11])    Surgeons: Merly Do MD Responsible Provider: Mahogany Hayes MD    Anesthesia Type: MAC ASA Status: 3          Anesthesia Type: No value filed.     Kev Phase I: Kev Score: 10    Kev Phase II:        Anesthesia Post Evaluation    Patient location during evaluation: bedside  Patient participation: complete - patient cannot participate  Level of consciousness: awake and alert  Pain score: 0  Airway patency: patent  Nausea & Vomiting: no nausea  Complications: no  Cardiovascular status: blood pressure returned to baseline  Respiratory status: acceptable  Hydration status: euvolemic  Pain management: adequate

## 2023-08-25 NOTE — ANESTHESIA PRE PROCEDURE
noninvasive hemodynamic monitor    Anesthetic plan and risks discussed with patient.                         Rustam Sher MD   8/25/2023

## 2023-12-01 RX ORDER — EMPAGLIFLOZIN 25 MG/1
25 TABLET, FILM COATED ORAL DAILY
Qty: 90 TABLET | Refills: 0 | Status: SHIPPED | OUTPATIENT
Start: 2023-12-01

## 2024-02-26 ENCOUNTER — PATIENT MESSAGE (OUTPATIENT)
Facility: CLINIC | Age: 54
End: 2024-02-26

## 2024-02-26 DIAGNOSIS — L73.9 FOLLICULITIS: Primary | ICD-10-CM

## 2024-02-26 RX ORDER — DOXYCYCLINE HYCLATE 100 MG
100 TABLET ORAL 2 TIMES DAILY
Qty: 20 TABLET | Refills: 0 | Status: SHIPPED | OUTPATIENT
Start: 2024-02-26 | End: 2024-03-07

## 2024-02-29 RX ORDER — EMPAGLIFLOZIN 25 MG/1
25 TABLET, FILM COATED ORAL DAILY
Qty: 90 TABLET | Refills: 0 | Status: SHIPPED | OUTPATIENT
Start: 2024-02-29

## 2024-03-21 LAB — HBA1C MFR BLD HPLC: 6.6 %

## 2024-06-05 RX ORDER — EMPAGLIFLOZIN 25 MG/1
25 TABLET, FILM COATED ORAL DAILY
Qty: 90 TABLET | Refills: 0 | Status: SHIPPED | OUTPATIENT
Start: 2024-06-05

## 2024-09-04 RX ORDER — EMPAGLIFLOZIN 25 MG/1
25 TABLET, FILM COATED ORAL DAILY
Qty: 90 TABLET | Refills: 0 | Status: SHIPPED | OUTPATIENT
Start: 2024-09-04

## 2024-11-14 ENCOUNTER — OFFICE VISIT (OUTPATIENT)
Facility: CLINIC | Age: 54
End: 2024-11-14

## 2024-11-14 VITALS
OXYGEN SATURATION: 98 % | HEIGHT: 66 IN | SYSTOLIC BLOOD PRESSURE: 110 MMHG | TEMPERATURE: 97.7 F | DIASTOLIC BLOOD PRESSURE: 65 MMHG | RESPIRATION RATE: 18 BRPM | HEART RATE: 72 BPM | BODY MASS INDEX: 29.41 KG/M2 | WEIGHT: 183 LBS

## 2024-11-14 DIAGNOSIS — R82.998 LEUKOCYTES IN URINE: ICD-10-CM

## 2024-11-14 DIAGNOSIS — E11.9 TYPE 2 DIABETES MELLITUS WITHOUT COMPLICATION, WITHOUT LONG-TERM CURRENT USE OF INSULIN (HCC): Primary | ICD-10-CM

## 2024-11-14 DIAGNOSIS — F32.A DEPRESSION, UNSPECIFIED DEPRESSION TYPE: ICD-10-CM

## 2024-11-14 DIAGNOSIS — Z86.718 PERSONAL HISTORY OF OTHER VENOUS THROMBOSIS AND EMBOLISM: ICD-10-CM

## 2024-11-14 DIAGNOSIS — E05.90 HYPERTHYROIDISM: ICD-10-CM

## 2024-11-14 DIAGNOSIS — E55.9 VITAMIN D DEFICIENCY, UNSPECIFIED: ICD-10-CM

## 2024-11-14 DIAGNOSIS — L73.9 FOLLICULITIS: ICD-10-CM

## 2024-11-14 LAB — HBA1C MFR BLD: 6.8 %

## 2024-11-14 RX ORDER — CLINDAMYCIN AND BENZOYL PEROXIDE 10; 50 MG/G; MG/G
GEL TOPICAL
Qty: 50 G | Refills: 1 | Status: SHIPPED | OUTPATIENT
Start: 2024-11-14

## 2024-11-14 RX ORDER — METHIMAZOLE 5 MG/1
5 TABLET ORAL DAILY
COMMUNITY
Start: 2024-04-03 | End: 2025-04-03

## 2024-11-14 RX ORDER — BUPROPION HYDROCHLORIDE 150 MG/1
150 TABLET, EXTENDED RELEASE ORAL 2 TIMES DAILY
Qty: 180 TABLET | Refills: 1 | Status: SHIPPED | OUTPATIENT
Start: 2024-11-14

## 2024-11-14 RX ORDER — DOXYCYCLINE HYCLATE 100 MG
100 TABLET ORAL 2 TIMES DAILY
Qty: 20 TABLET | Refills: 0 | Status: SHIPPED | OUTPATIENT
Start: 2024-11-14 | End: 2024-11-24

## 2024-11-14 SDOH — ECONOMIC STABILITY: FOOD INSECURITY: WITHIN THE PAST 12 MONTHS, YOU WORRIED THAT YOUR FOOD WOULD RUN OUT BEFORE YOU GOT MONEY TO BUY MORE.: NEVER TRUE

## 2024-11-14 SDOH — ECONOMIC STABILITY: INCOME INSECURITY: HOW HARD IS IT FOR YOU TO PAY FOR THE VERY BASICS LIKE FOOD, HOUSING, MEDICAL CARE, AND HEATING?: NOT HARD AT ALL

## 2024-11-14 SDOH — ECONOMIC STABILITY: FOOD INSECURITY: WITHIN THE PAST 12 MONTHS, THE FOOD YOU BOUGHT JUST DIDN'T LAST AND YOU DIDN'T HAVE MONEY TO GET MORE.: NEVER TRUE

## 2024-11-14 NOTE — PROGRESS NOTES
Chief Complaint   Patient presents with    Diabetes     Follow up   Labs done on 10/2024 in chart     Pt did not bring meds, went over list, pt confirmed     No other concerns     \"Have you been to the ER, urgent care clinic since your last visit?  Hospitalized since your last visit?\"    NO    “Have you seen or consulted any other health care providers outside our system since your last visit?”    NO     “Have you had a pap smear?”    Yes [facility]    No cervical cancer screening on file       “Have you had a diabetic eye exam?”    NO     No diabetic eye exam on file
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
## 2025-01-17 RX ORDER — EMPAGLIFLOZIN 25 MG/1
25 TABLET, FILM COATED ORAL DAILY
Qty: 90 TABLET | Refills: 0 | Status: SHIPPED | OUTPATIENT
Start: 2025-01-17

## 2025-03-03 RX ORDER — EMPAGLIFLOZIN 25 MG/1
25 TABLET, FILM COATED ORAL DAILY
Qty: 90 TABLET | Refills: 1 | Status: SHIPPED | OUTPATIENT
Start: 2025-03-03

## 2025-06-20 DIAGNOSIS — F32.A DEPRESSION, UNSPECIFIED DEPRESSION TYPE: ICD-10-CM

## 2025-06-20 RX ORDER — BUPROPION HYDROCHLORIDE 150 MG/1
150 TABLET, EXTENDED RELEASE ORAL 2 TIMES DAILY
Qty: 180 TABLET | Refills: 1 | OUTPATIENT
Start: 2025-06-20

## (undated) DEVICE — MASK ANES INF SZ 2 PREM TAIL VLV INFL PRT UNSCENTED SGL PT

## (undated) DEVICE — ENDOSCOPIC KIT 1.1+ DE BOWL

## (undated) DEVICE — CANNULA NASAL ADULT 10FT ETCO2/CO2 VENTFLO

## (undated) DEVICE — MASK O2 MED AD 7 FT 3 IN 1 W/ STD CONN LTX